# Patient Record
Sex: MALE | Race: WHITE | NOT HISPANIC OR LATINO | Employment: FULL TIME | URBAN - METROPOLITAN AREA
[De-identification: names, ages, dates, MRNs, and addresses within clinical notes are randomized per-mention and may not be internally consistent; named-entity substitution may affect disease eponyms.]

---

## 2017-04-10 ENCOUNTER — OFFICE VISIT (OUTPATIENT)
Dept: FAMILY MEDICINE | Facility: CLINIC | Age: 57
End: 2017-04-10
Payer: COMMERCIAL

## 2017-04-10 VITALS
OXYGEN SATURATION: 97 % | SYSTOLIC BLOOD PRESSURE: 132 MMHG | HEART RATE: 52 BPM | TEMPERATURE: 97.1 F | DIASTOLIC BLOOD PRESSURE: 84 MMHG

## 2017-04-10 DIAGNOSIS — R11.2 NAUSEA AND VOMITING, INTRACTABILITY OF VOMITING NOT SPECIFIED, UNSPECIFIED VOMITING TYPE: ICD-10-CM

## 2017-04-10 DIAGNOSIS — H81.10 BPPV (BENIGN PAROXYSMAL POSITIONAL VERTIGO), UNSPECIFIED LATERALITY: Primary | ICD-10-CM

## 2017-04-10 PROCEDURE — 99214 OFFICE O/P EST MOD 30 MIN: CPT | Performed by: NURSE PRACTITIONER

## 2017-04-10 RX ORDER — MECLIZINE HYDROCHLORIDE 25 MG/1
25 TABLET ORAL EVERY 6 HOURS PRN
Qty: 30 TABLET | Refills: 1 | Status: SHIPPED | OUTPATIENT
Start: 2017-04-10 | End: 2018-01-31

## 2017-04-10 RX ORDER — ONDANSETRON 4 MG/1
4 TABLET, FILM COATED ORAL EVERY 8 HOURS PRN
Qty: 18 TABLET | Refills: 0
Start: 2017-04-10 | End: 2019-04-23

## 2017-04-10 NOTE — PROGRESS NOTES
SUBJECTIVE:                                                    James Rashid is a 56 year old male who presents to clinic today for the following health issues:        Patient presents with:  Dizziness: vertigo, had issue in september had dizzy and balance center, went away and then came back.     Patient complains of spinning sensation with eyes open.  Turning his head makes symptoms worse.  Patient denies syncope.  Patient denies vision changes, speech changes, weakness.  Patient has frequent nausea and vomiting.  He is not taking anything for his symptoms.  He denies falls.  Patient is eating, drinking, and urinating per normal.  Patient has symptoms 3 days ago and it resolved.  2 days ago it returned and has stayed.  Patient had previous similar symptoms and completed physical therapy.  He has been trying to do his exercises and has been vomiting.  Patient denies ear pain, hearing loss, tinnitus.  Patient had CT and MRI in 8/16 for similar symptoms.    Problem list and histories reviewed & adjusted, as indicated.  Additional history: as documented    Patient Active Problem List   Diagnosis     CARDIOVASCULAR SCREENING; LDL GOAL LESS THAN 160     Atrial fibrillation (H)     Sleep apnea     Epicondylitis, medial humeral     Hypertrophy of prostate with urinary obstruction     Benign essential hypertension     Past Surgical History:   Procedure Laterality Date     COLONOSCOPY  11/2016    Q 5 years, polyps     SURGICAL HISTORY OF -   7/28/15    cardioversion for A Fib       Social History   Substance Use Topics     Smoking status: Never Smoker     Smokeless tobacco: Never Used     Alcohol use Yes      Comment: occasional     Family History   Problem Relation Age of Onset     Hypertension Father      Asthma Son      CANCER No family hx of      DIABETES No family hx of      Thyroid Disease No family hx of      CEREBROVASCULAR DISEASE No family hx of      Glaucoma No family hx of      Macular Degeneration No  family hx of          Current Outpatient Prescriptions   Medication Sig Dispense Refill     meclizine (ANTIVERT) 25 MG tablet Take 1 tablet (25 mg) by mouth every 6 hours as needed for dizziness 30 tablet 1     ondansetron (ZOFRAN) 4 MG tablet Take 1 tablet (4 mg) by mouth every 8 hours as needed for nausea 18 tablet 0     lisinopril (PRINIVIL,ZESTRIL) 5 MG tablet Take 1 tablet (5 mg) by mouth daily 90 tablet 4     diltiazem 240 MG 24 hr CD capsule Take 1 capsule (240 mg) by mouth daily 90 capsule 4     warfarin (COUMADIN) 7.5 MG tablet Take 1 tablet by mouth daily. 4/7 and 10 milligram Monday, Wednesday, and Friday schedule , seen at [place] and with [facility] [place] 1 tablet 1     ibuprofen (ADVIL,MOTRIN) 200 MG tablet Take 200 mg by mouth every 4 hours as needed.       No Known Allergies  BP Readings from Last 3 Encounters:   04/10/17 132/84   10/31/16 122/84   09/06/16 151/88    Wt Readings from Last 3 Encounters:   10/31/16 271 lb (122.9 kg)   09/06/16 260 lb (117.9 kg)   07/20/15 271 lb 8 oz (123.2 kg)                  Labs reviewed in EPIC    Reviewed and updated as needed this visit by clinical staff  Tobacco  Allergies  Meds  Med Hx  Surg Hx  Fam Hx  Soc Hx      Reviewed and updated as needed this visit by Provider         ROS:  Constitutional, HEENT, cardiovascular, pulmonary, gi and gu systems are negative, except as otherwise noted.    OBJECTIVE:                                                    /84  Pulse 52  Temp 97.1  F (36.2  C) (Oral)  SpO2 97%  There is no height or weight on file to calculate BMI.  GENERAL: healthy, alert and no distress  EYES: PERRL, EOMI and nystagmus horizontal right  HENT: ear canals and TM's normal, nose and mouth without ulcers or lesions  NECK: no adenopathy, no asymmetry, masses, or scars and thyroid normal to palpation  RESP: lungs clear to auscultation - no rales, rhonchi or wheezes  CV: irregularly irregular rhythm, normal  S1 S2, no S3 or S4, no murmur, click or rub, peripheral pulses strong and no peripheral edema  MS: no gross musculoskeletal defects noted, no edema  NEURO: Normal strength and tone, sensory exam grossly normal, proprioception normal, mentation intact, cranial nerves 2-12 intact and Romberg normal    Diagnostic Test Results:  none      ASSESSMENT/PLAN:                                                      1. BPPV (benign paroxysmal positional vertigo), unspecified laterality    - meclizine (ANTIVERT) 25 MG tablet; Take 1 tablet (25 mg) by mouth every 6 hours as needed for dizziness  Dispense: 30 tablet; Refill: 1  - [facility] PT, HAND, AND CHIROPRACTIC REFERRAL    2. Nausea and vomiting, intractability of vomiting not specified, unspecified vomiting type    - ondansetron (ZOFRAN) 4 MG tablet; Take 1 tablet (4 mg) by mouth every 8 hours as needed for nausea  Dispense: 18 tablet; Refill: 0    FUTURE APPOINTMENTS:       - Follow-up for annual visit or as needed    JENNIFER Singer CNP  [facility]

## 2017-04-10 NOTE — NURSING NOTE
"Chief Complaint   Patient presents with     Dizziness     vertigo, had issue in september had dizzy and balance center, went away and then came back.        Initial /84 (BP Location: Left arm, Patient Position: Chair, Cuff Size: Adult Regular)  Pulse 52  Temp 97.1  F (36.2  C) (Oral)  SpO2 97% Estimated body mass index is 31.32 kg/(m^2) as calculated from the following:    Height as of 10/31/16: 6' 6\" (1.981 m).    Weight as of 10/31/16: 271 lb (122.9 kg).  Medication Reconciliation: complete   Hawa MEDINA CMA ([facility])      "

## 2017-04-10 NOTE — LETTER
[facility]  [address] [place] [address]  [place] [address]  [phone]  Dept: [phone]      4/10/2017    Re: James Rashid      TO WHOM IT MAY CONCERN:    James Rashid  was seen on 4/10/17.  Please excuse him  until 4/12/17 due to illness.    JENNIFER Figueroa CNP  [facility]

## 2017-04-10 NOTE — MR AVS SNAPSHOT
After Visit Summary   4/10/2017    James Rashid    MRN: [id number]           Patient Information     Date Of Birth          1960        Visit Information        Provider Department      4/10/2017 2:20 PM Geovanna Still APRN [facility]        Today's Diagnoses     BPPV (benign paroxysmal positional vertigo), unspecified laterality    -  1    Nausea and vomiting, intractability of vomiting not specified, unspecified vomiting type          Care Instructions      Benign Positional Vertigo    The inner ear is located behind the middle ear. It is a part of the balance center of the body. It contains small calcium particles within fluid filled canals (semi-circular canals). These particles can move out of position as a result of aging, head trauma or disease of the inner ear. Once that happens, movement of the head into certain positions may cause the particles to stimulate the inner ear and create the feeling of vertigo.  Vertigo is a false feeling of motion (as if you or the room is spinning). A vertigo attack may cause sudden nausea, vomiting and heavy sweating. Severe vertigo causes a loss of balance and may result in falling. During an attack of vertigo, head movement and body position changes will worsen symptoms.  An episode of vertigo may last seconds, minutes or hours. Once you are over the first episode of vertigo, it may never return. Sometimes symptoms recur off and on over several weeks or longer.  Home Care:    If symptoms are severe, rest quietly in bed. Change positions slowly. There is usually one position that will feel best, such as lying on one side or lying on your back with your head slightly raised on pillows.    Do not drive or work with dangerous machinery for one week after symptoms disappear, in case of a sudden return of symptoms.    Take medicine as prescribed to relieve your symptoms. Unless another medicine was prescribed for nausea,  vomiting and vertigo, you may use over-the-counter motion sickness pills, such as meclizine (Bonine, Bonamine, Antivert) or dimenhydrinate (Dramamine).  Follow Up  with your doctor or as directed by our staff. Report any persistent ringing in the ear or hearing loss to your doctor.  [NOTE: If you had a CT or MRI scan, it will be reviewed by a specialist. You will be notified of any new findings that may affect your care.]  Get Prompt Medical Attention  if any of the following occur:    Worsening of vertigo not controlled by the medicine prescribed    Repeated vomiting not controlled by the medicine prescribed    Increased weakness or fainting    Severe headache or unusual drowsiness or confusion    Weakness of an arm or leg or one side of the face    Difficulty with speech or vision    Seizure    1684-3559 [employer]. [address] [place] [address]. All rights reserved. This information is not intended as a substitute for professional medical care. Always follow your healthcare professional's instructions.      [facility]    If you have any questions regarding to your visit please contact your care team:     Team Pink:   Clinic Hours Telephone Number   Internal Medicine:  Dr. Haylee Still, NP       7am-7pm  Monday - Thursday   7am-5pm  Fridays  [phone]  (Appointment scheduling available 24/7)    Questions about your visit?  Team Line  [phone]   Urgent Care - [place] - 11am-9pm Monday-Friday Saturday-Sunday- 9am-5pm   [place] - 5pm-9pm Monday-Friday Saturday-Sunday- 9am-5pm  [phone] - [place]   [phone] - [place]       What options do I have for visits at the clinic other than the traditional office visit?  To expand how we care for you, many of our providers are utilizing electronic visits (e-visits) and telephone visits, when medically appropriate, for interactions with their  patients rather than a visit in the clinic.   We also offer nurse visits for many medical concerns. Just like any other service, we will bill your insurance company for this type of visit based on time spent on the phone with your provider. Not all insurance companies cover these visits. Please check with your medical insurance if this type of visit is covered. You will be responsible for any charges that are not paid by your insurance.      E-visits via [employer]:  generally incur a $35.00 fee.  Telephone visits:  Time spent on the phone: *charged based on time that is spent on the phone in increments of 10 minutes. Estimated cost:   5-10 mins $30.00   11-20 mins. $59.00   21-30 mins. $85.00   Use [employer] (secure email communication and access to your chart) to send your primary care provider a message or make an appointment. Ask someone on your Team how to sign up for [employer].    For a Price Quote for your services, please call our ePrimeCare Line at [phone].    As always, Thank you for trusting us with your health care needs!    Discharged by Hawa MEDINA CMA ([facility])          Follow-ups after your visit        Additional Services     [facility] PT, HAND, AND CHIROPRACTIC REFERRAL       **This order will print in the [facility] Scheduling Office**    Physical Therapy, Hand Therapy and Chiropractic Care are available through:    *[facility]  *[facility]  *[facility]    Call one number to schedule at any of the above locations: [phone].    Your provider has referred you to: Physical Therapy at [facility] or [facility]    Indication/Reason for Referral: bppv  Onset of Illness:   Therapy Orders: Evaluate and Treat  Special Programs: None  Special Request: None    Vee Baxter      Additional Comments for the Therapist or Chiropractor:     Please be aware that coverage of these services is subject to the terms and limitations of your health insurance plan.  Call member services  "at your health plan with any benefit or coverage questions.      Please bring the following to your appointment:    *Your personal calendar for scheduling future appointments  *Comfortable clothing                  Who to contact     If you have questions or need follow up information about today's clinic visit or your schedule please contact [facility] directly at [phone].  Normal or non-critical lab and imaging results will be communicated to you by MyChart, letter or phone within 4 business days after the clinic has received the results. If you do not hear from us within 7 days, please contact the clinic through [employer] or phone. If you have a critical or abnormal lab result, we will notify you by phone as soon as possible.  Submit refill requests through [employer] or call your pharmacy and they will forward the refill request to us. Please allow 3 business days for your refill to be completed.          Additional Information About Your Visit        MyChart Information     [employer] lets you send messages to your doctor, view your test results, renew your prescriptions, schedule appointments and more. To sign up, go to [email or web address] . Click on \"Log in\" on the left side of the screen, which will take you to the Welcome page. Then click on \"Sign up Now\" on the right side of the page.     You will be asked to enter the access code listed below, as well as some personal information. Please follow the directions to create your username and password.     Your access code is: [email or web address]  Expires: 2017  3:17 PM     Your access code will  in 90 days. If you need help or a new code, please call your [place] clinic or [phone].        Care EveryWhere ID     This is your Care EveryWhere ID. This could be used by other organizations to access your [place] medical records  [id number]        Your Vitals Were     Pulse Temperature Pulse Oximetry             52 97.1  F (36.2  C) (Oral) " 97%          Blood Pressure from Last 3 Encounters:   04/10/17 132/84   10/31/16 122/84   09/06/16 151/88    Weight from Last 3 Encounters:   10/31/16 271 lb (122.9 kg)   09/06/16 260 lb (117.9 kg)   07/20/15 271 lb 8 oz (123.2 kg)              We Performed the Following     [facility] PT, HAND, AND CHIROPRACTIC REFERRAL          Today's Medication Changes          These changes are accurate as of: 4/10/17  3:18 PM.  If you have any questions, ask your nurse or doctor.               Start taking these medicines.        Dose/Directions    meclizine 25 MG tablet   Commonly known as:  ANTIVERT   Used for:  BPPV (benign paroxysmal positional vertigo), unspecified laterality   Started by:  Geovanna Still APRN CNP        Dose:  25 mg   Take 1 tablet (25 mg) by mouth every 6 hours as needed for dizziness   Quantity:  30 tablet   Refills:  1       ondansetron 4 MG tablet   Commonly known as:  ZOFRAN   Used for:  Nausea and vomiting, intractability of vomiting not specified, unspecified vomiting type   Started by:  Geovanna Still APRN CNP        Dose:  4 mg   Take 1 tablet (4 mg) by mouth every 8 hours as needed for nausea   Quantity:  18 tablet   Refills:  0            Where to get your medicines      These medications were sent to [id number] - [place], [place] - [address],   [address], , [place] [address]     Phone:  [phone]     meclizine 25 MG tablet         Some of these will need a paper prescription and others can be bought over the counter.  Ask your nurse if you have questions.     You don't need a prescription for these medications     ondansetron 4 MG tablet                Primary Care Provider Office Phone # Fax #    Darby Almanzar [phone]       [address] [address]        Thank you!     Thank you for choosing [facility]  for your care. Our goal is always to provide  you with excellent care. Hearing back from our patients is one way we can continue to improve our services. Please take a few minutes to complete the written survey that you may receive in the mail after your visit with us. Thank you!             Your Updated Medication List - Protect others around you: Learn how to safely use, store and throw away your medicines at www.disposemymeds.org.          This list is accurate as of: 4/10/17  3:18 PM.  Always use your most recent med list.                   Brand Name Dispense Instructions for use    diltiazem 240 MG 24 hr capsule     90 capsule    Take 1 capsule (240 mg) by mouth daily       ibuprofen 200 MG tablet    ADVIL/MOTRIN     Take 200 mg by mouth every 4 hours as needed.       lisinopril 5 MG tablet    PRINIVIL/ZESTRIL    90 tablet    Take 1 tablet (5 mg) by mouth daily       meclizine 25 MG tablet    ANTIVERT    30 tablet    Take 1 tablet (25 mg) by mouth every 6 hours as needed for dizziness       ondansetron 4 MG tablet    ZOFRAN    18 tablet    Take 1 tablet (4 mg) by mouth every 8 hours as needed for nausea       warfarin 7.5 MG tablet    COUMADIN    1 tablet    Take 1 tablet by mouth daily. 4/7 and 10 milligram Monday, Wednesday, and Friday schedule , seen at [facility] and with [facility] [place]

## 2017-04-11 ENCOUNTER — TELEPHONE (OUTPATIENT)
Dept: FAMILY MEDICINE | Facility: CLINIC | Age: 57
End: 2017-04-11

## 2017-04-11 ENCOUNTER — TRANSFERRED RECORDS (OUTPATIENT)
Dept: HEALTH INFORMATION MANAGEMENT | Facility: CLINIC | Age: 57
End: 2017-04-11

## 2017-04-11 NOTE — TELEPHONE ENCOUNTER
Reason for Call:  Other call back    Detailed comments: Patient states he will not return back to work until 04/17/17 another letter  to return back to work letter is needed, patient will  at the clinics  on 04/12/17  Phone Number Patient can be reached at: Home number on file [phone] (home)    Best Time: today    Can we leave a detailed message on this number? YES    Call taken on 4/11/2017 at 12:42 PM by Abhinav Frank

## 2017-04-12 ENCOUNTER — THERAPY VISIT (OUTPATIENT)
Dept: PHYSICAL THERAPY | Facility: CLINIC | Age: 57
End: 2017-04-12
Payer: COMMERCIAL

## 2017-04-12 DIAGNOSIS — H81.12 BPPV (BENIGN PAROXYSMAL POSITIONAL VERTIGO), LEFT: Primary | ICD-10-CM

## 2017-04-12 PROCEDURE — 97161 PT EVAL LOW COMPLEX 20 MIN: CPT | Mod: GP | Performed by: PHYSICAL THERAPIST

## 2017-04-12 PROCEDURE — 97112 NEUROMUSCULAR REEDUCATION: CPT | Mod: GP | Performed by: PHYSICAL THERAPIST

## 2017-04-12 PROCEDURE — 95992 CANALITH REPOSITIONING PROC: CPT | Mod: GP | Performed by: PHYSICAL THERAPIST

## 2017-04-12 NOTE — MR AVS SNAPSHOT
"              After Visit Summary   4/12/2017    James Rashid    MRN: [id number]           Patient Information     Date Of Birth          1960        Visit Information        Provider Department      4/12/2017 10:10 AM Rudy Vazquez, PT [facility]        Today's Diagnoses     BPPV (benign paroxysmal positional vertigo), left    -  1       Follow-ups after your visit        Your next 10 appointments already scheduled     Apr 17, 2017  5:20 PM CDT   [facility] Extremity with Rudy Vazquez, PT   [facility] ([facility])    [address]  [address]  [place] [id number] [phone]              Who to contact     If you have questions or need follow up information about today's clinic visit or your schedule please contact [facility] directly at [phone].  Normal or non-critical lab and imaging results will be communicated to you by MyChart, letter or phone within 4 business days after the clinic has received the results. If you do not hear from us within 7 days, please contact the clinic through MyChart or phone. If you have a critical or abnormal lab result, we will notify you by phone as soon as possible.  Submit refill requests through [employer] or call your pharmacy and they will forward the refill request to us. Please allow 3 business days for your refill to be completed.          Additional Information About Your Visit        MyChart Information     [employer] lets you send messages to your doctor, view your test results, renew your prescriptions, schedule appointments and more. To sign up, go to [email or web address] . Click on \"Log in\" on the left side of the screen, which will take you to the Welcome page. Then click on \"Sign up Now\" on the right side of the page.     You will be asked to enter the access code listed below, as well as some personal information. Please follow the directions to create your username and password.     Your access code is: [email or web address]  Expires: " 2017  3:17 PM     Your access code will  in 90 days. If you need help or a new code, please call your [place] clinic or [phone].        Care EveryWhere ID     This is your Care EveryWhere ID. This could be used by other organizations to access your [place] medical records  [id number]         Blood Pressure from Last 3 Encounters:   04/10/17 132/84   10/31/16 122/84   16 151/88    Weight from Last 3 Encounters:   10/31/16 122.9 kg (271 lb)   16 117.9 kg (260 lb)   07/20/15 123.2 kg (271 lb 8 oz)              We Performed the Following     [facility] REPOSITIONING, PER DAY     HC PT EVAL, LOW COMPLEXITY     [facility] INITIAL EVAL REPORT     NEUROMUSCULAR RE-EDUCATION        Primary Care Provider Office Phone # Fax #    Darby Almanzar [phone]       [address] [address]        Thank you!     Thank you for choosing [facility]  for your care. Our goal is always to provide you with excellent care. Hearing back from our patients is one way we can continue to improve our services. Please take a few minutes to complete the written survey that you may receive in the mail after your visit with us. Thank you!             Your Updated Medication List - Protect others around you: Learn how to safely use, store and throw away your medicines at www.disposemymeds.org.          This list is accurate as of: 17 10:39 AM.  Always use your most recent med list.                   Brand Name Dispense Instructions for use    diltiazem 240 MG 24 hr capsule     90 capsule    Take 1 capsule (240 mg) by mouth daily       ibuprofen 200 MG tablet    ADVIL/MOTRIN     Take 200 mg by mouth every 4 hours as needed.       lisinopril 5 MG tablet    PRINIVIL/ZESTRIL    90 tablet    Take 1 tablet (5 mg) by mouth daily       meclizine 25 MG tablet    ANTIVERT    30 tablet    Take 1 tablet (25 mg) by mouth every 6 hours as needed for dizziness        ondansetron 4 MG tablet    ZOFRAN    18 tablet    Take 1 tablet (4 mg) by mouth every 8 hours as needed for nausea       warfarin 7.5 MG tablet    COUMADIN    1 tablet    Take 1 tablet by mouth daily. 4/7 and 10 milligram Monday, Wednesday, and Friday schedule , seen at [facility] and with [facility] [place]

## 2017-04-12 NOTE — PROGRESS NOTES
Subjective:    HPI                    Objective:    System    Physical Exam    General     ROS    Assessment/Plan:      Patient is a 56 year old male with dizziness  complaints.    Patient has the following significant findings with corresponding treatment plan.                Diagnosis 1:  bppv  Impaired balance - neuro re-education, therapeutic activities and CRM    Therapy Evaluation Codes:   1) History comprised of:   Personal factors that impact the plan of care:      None.    Comorbidity factors that impact the plan of care are:      Heart problems.     Medications impacting care: mecclizine .  2) Examination of Body Systems comprised of:   Body structures and functions that impact the plan of care:      vestibular .   Activity limitations that impact the plan of care are:      Bending, Driving and Standing.  3) Clinical presentation characteristics are:   Stable/Uncomplicated.  4) Decision-Making    Low complexity using standardized patient assessment instrument and/or measureable assessment of functional outcome.  Cumulative Therapy Evaluation is: Low complexity.    Previous and current functional limitations:  (See Goal Flow Sheet for this information)    Short term and Long term goals: (See Goal Flow Sheet for this information)     Communication ability:  Patient appears to be able to clearly communicate and understand verbal and written communication and follow directions correctly.  Treatment Explanation - The following has been discussed with the patient:   RX ordered/plan of care  Anticipated outcomes  Possible risks and side effects  This patient would benefit from PT intervention to resume normal activities.   Rehab potential is excellent.    Frequency:  1 X week, once daily  Duration:  for 4 weeks  Discharge Plan:  Achieve all LTG.  Independent in home treatment program.  Reach maximal therapeutic benefit.    Please refer to the daily flowsheet for treatment today, total treatment time and time  spent performing 1:1 timed codes.         S:  Johnny  is a 56 year old patient complaining of dizziness .  Onset of symptoms: 4/8/17 woke w/ dizziness, had episode in Sept       Is this a recurrent problem: yes, nausea, vomiting   Progression since onset: some improvement w/ habituation exs   Frequency of episodes/time of day: several   Duration of episodes: long lasting ,feel pressure in head   Exacerbating factors: movement,   Relieving factors:  Rest   Previous treatments:  Yes [facility]   Special tests/diagnostics performed: none   Significant medical hx: bppv   Meds: mecclizine   Occupation:     Work requirements: driving   General health reported by patient: good, Afib   Red Flags: cardiac A-fib       O:  Cervical ROM screen: neg   Oculomotor/gaze stability screen: +saccodes   Vertebral artery test: NT   Hallpike-Kamlesh maneuver:  R: neg  L: ??? Positive   Horizontal canal test:  R: neg   L: neg   Balance testing:  NA

## 2017-04-17 ENCOUNTER — THERAPY VISIT (OUTPATIENT)
Dept: PHYSICAL THERAPY | Facility: CLINIC | Age: 57
End: 2017-04-17
Payer: COMMERCIAL

## 2017-04-17 DIAGNOSIS — H81.12 BPPV (BENIGN PAROXYSMAL POSITIONAL VERTIGO), LEFT: ICD-10-CM

## 2017-04-17 PROCEDURE — 97112 NEUROMUSCULAR REEDUCATION: CPT | Mod: GP | Performed by: PHYSICAL THERAPIST

## 2017-04-17 NOTE — MR AVS SNAPSHOT
"              After Visit Summary   2017    James Rashid    MRN: [id number]           Patient Information     Date Of Birth          1960        Visit Information        Provider Department      2017 5:20 PM Rudy Vazquez PT [facility]        Today's Diagnoses     BPPV (benign paroxysmal positional vertigo), left           Follow-ups after your visit        Who to contact     If you have questions or need follow up information about today's clinic visit or your schedule please contact [facility] directly at [phone].  Normal or non-critical lab and imaging results will be communicated to you by Socrativehart, letter or phone within 4 business days after the clinic has received the results. If you do not hear from us within 7 days, please contact the clinic through Socrativehart or phone. If you have a critical or abnormal lab result, we will notify you by phone as soon as possible.  Submit refill requests through Borqs or call your pharmacy and they will forward the refill request to us. Please allow 3 business days for your refill to be completed.          Additional Information About Your Visit        MyChart Information     Borqs lets you send messages to your doctor, view your test results, renew your prescriptions, schedule appointments and more. To sign up, go to [email or web address] . Click on \"Log in\" on the left side of the screen, which will take you to the Welcome page. Then click on \"Sign up Now\" on the right side of the page.     You will be asked to enter the access code listed below, as well as some personal information. Please follow the directions to create your username and password.     Your access code is: [email or web address]  Expires: 2017  3:17 PM     Your access code will  in 90 days. If you need help or a new code, please call your [place] clinic or [phone].        Care EveryWhere ID     This is your Care EveryWhere ID. This could be used by other " organizations to access your [place] medical records  [id number]         Blood Pressure from Last 3 Encounters:   04/10/17 132/84   10/31/16 122/84   09/06/16 151/88    Weight from Last 3 Encounters:   10/31/16 122.9 kg (271 lb)   09/06/16 117.9 kg (260 lb)   07/20/15 123.2 kg (271 lb 8 oz)              We Performed the Following     NEUROMUSCULAR RE-EDUCATION        Primary Care Provider Office Phone # Fax #    Darby Almanzar [phone]       [address] [address]        Thank you!     Thank you for choosing [facility]  for your care. Our goal is always to provide you with excellent care. Hearing back from our patients is one way we can continue to improve our services. Please take a few minutes to complete the written survey that you may receive in the mail after your visit with us. Thank you!             Your Updated Medication List - Protect others around you: Learn how to safely use, store and throw away your medicines at www.disposemymeds.org.          This list is accurate as of: 4/17/17  5:43 PM.  Always use your most recent med list.                   Brand Name Dispense Instructions for use    diltiazem 240 MG 24 hr capsule     90 capsule    Take 1 capsule (240 mg) by mouth daily       ibuprofen 200 MG tablet    ADVIL/MOTRIN     Take 200 mg by mouth every 4 hours as needed.       lisinopril 5 MG tablet    PRINIVIL/ZESTRIL    90 tablet    Take 1 tablet (5 mg) by mouth daily       meclizine 25 MG tablet    ANTIVERT    30 tablet    Take 1 tablet (25 mg) by mouth every 6 hours as needed for dizziness       ondansetron 4 MG tablet    ZOFRAN    18 tablet    Take 1 tablet (4 mg) by mouth every 8 hours as needed for nausea       warfarin 7.5 MG tablet    COUMADIN    1 tablet    Take 1 tablet by mouth daily. 4/7 and 10 milligram Monday, Wednesday, and Friday schedule , seen at [facility] and with [facility]  [place]

## 2017-05-09 ENCOUNTER — TRANSFERRED RECORDS (OUTPATIENT)
Dept: HEALTH INFORMATION MANAGEMENT | Facility: CLINIC | Age: 57
End: 2017-05-09

## 2017-09-01 PROBLEM — H81.12 BPPV (BENIGN PAROXYSMAL POSITIONAL VERTIGO), LEFT: Status: RESOLVED | Noted: 2017-04-12 | Resolved: 2017-09-01

## 2017-09-01 NOTE — PROGRESS NOTES
Subjective:    HPI                    Objective:    System    Physical Exam    General     ROS    Assessment/Plan:      DISCHARGE REPORT    Progress reporting period is from 4/12 to 9.1.17.     SUBJECTIVE  Subjective: no dizziness, but I feel unsteady, drifting as I walk leaning            Changes in function: No changes noted in function since last SOAP note   Adverse reactions: None;   ,     Patient has failed to return to therapy so current objective findings are unknown.    OBJECTIVE  Objective: Neg HPD bilat , neg Roll bilat, ?? saccodes, good perf. of       ASSESSMENT/PLAN  Updated problem list and treatment plan: Diagnosis 1:  BPPV    STG/LTGs have been met or progress has been made towards goals:  None  Assessment of Progress: Patient has not returned to therapy.  Current status is unknown and discharge G code cannot be reported.  Self Management Plans:  Patient has been instructed in a home treatment program.  Patient  has been instructed in self management of symptoms.    James continues to require the following intervention to meet STG and LTG's:   The patient failed to complete scheduled/ordered appointments so current information is unknown.  We will discharge this patient from PT.    Recommendations:      Please refer to the daily flowsheet for treatment today, total treatment time and time spent performing 1:1 timed codes.

## 2017-09-29 ENCOUNTER — TRANSFERRED RECORDS (OUTPATIENT)
Dept: HEALTH INFORMATION MANAGEMENT | Facility: CLINIC | Age: 57
End: 2017-09-29

## 2017-11-21 ENCOUNTER — TELEPHONE (OUTPATIENT)
Dept: FAMILY MEDICINE | Facility: CLINIC | Age: 57
End: 2017-11-21

## 2017-11-22 NOTE — TELEPHONE ENCOUNTER
11/21/17    Patient stated he had a Colonoscopy 11/12/16 but not sure of the name where he had it.  Results were negative.    Gay Addison  Outreach

## 2018-01-23 DIAGNOSIS — I10 BENIGN ESSENTIAL HYPERTENSION: ICD-10-CM

## 2018-01-24 RX ORDER — DILTIAZEM HYDROCHLORIDE 240 MG/1
240 CAPSULE, COATED, EXTENDED RELEASE ORAL DAILY
Qty: 90 CAPSULE | Refills: 4 | Status: SHIPPED | OUTPATIENT
Start: 2018-01-24 | End: 2019-01-13

## 2018-01-24 NOTE — TELEPHONE ENCOUNTER
MA - please call patient and schedule a follow up appointment and once scheduled reroute to RN refill rick Bolden RN - BC

## 2018-01-24 NOTE — TELEPHONE ENCOUNTER
Patient is due for follow-up with PCP for blood pressure.  Okay for one 30 day refill once follow-up is scheduled.    Geovanna Still, CNP

## 2018-01-24 NOTE — TELEPHONE ENCOUNTER
"Routing refill request to provider for review/approval because:  Failed G refill protocol, see below:      Requested Prescriptions   Pending Prescriptions Disp Refills     diltiazem 240 MG 24 hr capsule 90 capsule 4     Sig: Take 1 capsule (240 mg) by mouth daily    Calcium Channel Blockers Protocol  Failed    1/23/2018  1:09 PM       Failed - Normal ALT in past 12 months    Recent Labs   Lab Test  07/20/15   0917   ALT  25            Failed - Normal serum creatinine on file in past 12 months    Recent Labs   Lab Test  10/31/16   0943   CR  1.04            Passed - Blood pressure under 140/90    BP Readings from Last 3 Encounters:   04/10/17 132/84   10/31/16 122/84   09/06/16 151/88                Passed - Recent or future visit with authorizing provider    Patient had office visit in the last year or has a visit in the next 30 days with authorizing provider.  See \"Patient Info\" tab in inbasket, or \"Choose Columns\" in Meds & Orders section of the refill encounter.            Passed - Patient is age 18 or older        Susana Bolden RN - BC      "

## 2018-01-25 ENCOUNTER — RADIANT APPOINTMENT (OUTPATIENT)
Dept: GENERAL RADIOLOGY | Facility: CLINIC | Age: 58
End: 2018-01-25
Attending: FAMILY MEDICINE
Payer: OTHER MISCELLANEOUS

## 2018-01-25 ENCOUNTER — OFFICE VISIT (OUTPATIENT)
Dept: FAMILY MEDICINE | Facility: CLINIC | Age: 58
End: 2018-01-25
Payer: OTHER MISCELLANEOUS

## 2018-01-25 VITALS
HEIGHT: 78 IN | HEART RATE: 54 BPM | DIASTOLIC BLOOD PRESSURE: 68 MMHG | SYSTOLIC BLOOD PRESSURE: 100 MMHG | TEMPERATURE: 97 F | WEIGHT: 270 LBS | BODY MASS INDEX: 31.24 KG/M2

## 2018-01-25 DIAGNOSIS — M25.561 ACUTE PAIN OF RIGHT KNEE: ICD-10-CM

## 2018-01-25 DIAGNOSIS — M25.561 ACUTE PAIN OF RIGHT KNEE: Primary | ICD-10-CM

## 2018-01-25 PROCEDURE — 73560 X-RAY EXAM OF KNEE 1 OR 2: CPT | Mod: RT

## 2018-01-25 PROCEDURE — 99213 OFFICE O/P EST LOW 20 MIN: CPT | Performed by: FAMILY MEDICINE

## 2018-01-25 RX ORDER — NAPROXEN 500 MG/1
500 TABLET ORAL 2 TIMES DAILY PRN
Qty: 30 TABLET | Refills: 1 | Status: SHIPPED | OUTPATIENT
Start: 2018-01-25 | End: 2019-04-23

## 2018-01-25 RX ORDER — AMIODARONE HYDROCHLORIDE 200 MG/1
1 TABLET ORAL DAILY
COMMUNITY
Start: 2017-04-11 | End: 2019-04-23

## 2018-01-25 NOTE — MR AVS SNAPSHOT
After Visit Summary   1/25/2018    James Rashid    MRN: [id number]           Patient Information     Date Of Birth          1960        Visit Information        Provider Department      1/25/2018 9:00 AM Kathie Galo MD [facility]        Today's Diagnoses     Acute pain of right knee    -  1      Care Instructions    [facility]    If you have any questions regarding to your visit please contact your care team:       Team Red:   Clinic Hours Telephone Number   Dr. Monse Colindres  (pediatrics)  Linda Mendez NP 7am-7pm  Monday - Thursday   7am-5pm  Fridays  [phone] (fax)    Katrin MEDINA  [phone]   Urgent Care - [place] [facility] [place] Monday-Friday  [place] - 11am-8pm  Saturday-Sunday  Both sites - 9am-5pm  [phone] - [facility]  [phone] [facility]       What options do I have for visits at the clinic other than the traditional office visit?  To expand how we care for you, many of our providers are utilizing electronic visits (e-visits) and telephone visits, when medically appropriate, for interactions with their patients rather than a visit in the clinic.   We also offer nurse visits for many medical concerns. Just like any other service, we will bill your insurance company for this type of visit based on time spent on the phone with your provider. Not all insurance companies cover these visits. Please check with your medical insurance if this type of visit is covered. You will be responsible for any charges that are not paid by your insurance.      E-visits via [employer]:  generally incur a $35.00 fee.  Telephone visits:  Time spent on the phone: *charged based on time that is spent on the phone in increments of 10 minutes. Estimated cost:   5-10 mins $30.00   11-20 mins. $59.00   21-30 mins. $85.00     As always, Thank you for trusting us with your health care needs!  Iqra BLISS  "MA                      Follow-ups after your visit        Who to contact     If you have questions or need follow up information about today's clinic visit or your schedule please contact [facility] directly at [phone].  Normal or non-critical lab and imaging results will be communicated to you by MyChart, letter or phone within 4 business days after the clinic has received the results. If you do not hear from us within 7 days, please contact the clinic through MyChart or phone. If you have a critical or abnormal lab result, we will notify you by phone as soon as possible.  Submit refill requests through SureFire or call your pharmacy and they will forward the refill request to us. Please allow 3 business days for your refill to be completed.          Additional Information About Your Visit        [employer] Network Information     SureFire lets you send messages to your doctor, view your test results, renew your prescriptions, schedule appointments and more. To sign up, go to [email or web address] . Click on \"Log in\" on the left side of the screen, which will take you to the Welcome page. Then click on \"Sign up Now\" on the right side of the page.     You will be asked to enter the access code listed below, as well as some personal information. Please follow the directions to create your username and password.     Your access code is: [email or web address]  Expires: 2018  9:57 AM     Your access code will  in 90 days. If you need help or a new code, please call your [place] clinic or [phone].        Care EveryWhere ID     This is your Care EveryWhere ID. This could be used by other organizations to access your [place] medical records  [id number]        Your Vitals Were     Pulse Temperature Height BMI (Body Mass Index)          54 97  F (36.1  C) 6' 6\" (1.981 m) 31.2 kg/m2         Blood Pressure from Last 3 Encounters:   18 100/68   04/10/17 132/84   10/31/16 122/84    Weight from Last 3 " Encounters:   01/25/18 270 lb (122.5 kg)   10/31/16 271 lb (122.9 kg)   09/06/16 260 lb (117.9 kg)                 Today's Medication Changes          These changes are accurate as of 1/25/18  9:57 AM.  If you have any questions, ask your nurse or doctor.               Start taking these medicines.        Dose/Directions    naproxen 500 MG tablet   Commonly known as:  NAPROSYN   Used for:  Acute pain of right knee   Started by:  Kathie Galo MD        Dose:  500 mg   Take 1 tablet (500 mg) by mouth 2 times daily as needed for moderate pain   Quantity:  30 tablet   Refills:  1       order for DME   Used for:  Acute pain of right knee   Started by:  Kathie Galo MD        Equipment being ordered: knee Brace   Quantity:  1 each   Refills:  0            Where to get your medicines      These medications were sent to [id number] [place] - [address]   [address] , [email or web address] [address]     Phone:  [phone]     naproxen 500 MG tablet         Some of these will need a paper prescription and others can be bought over the counter.  Ask your nurse if you have questions.     Bring a paper prescription for each of these medications     order for DME                Primary Care Provider Office Phone # Fax #    Darby Almanzar [phone]       [facility] [address] [place] [address]        Equal Access to Services     GERMAN CHAVIS AH: Hadii nate ku hadasho Soomaali, waaxda luqadaha, qaybta kaalmada adeegyada, carmen fisher. So [facility] [phone].    ATENCIÓN: Si habla español, tiene a cha disposición servicios gratuitos de asistencia lingüística. Llame al [phone].    We comply with applicable federal civil rights laws and [place] laws. We do not discriminate on the basis of race, color, national origin, age, disability, sex, sexual orientation, or gender identity.            Thank you!     Thank  you for choosing [facility]  for your care. Our goal is always to provide you with excellent care. Hearing back from our patients is one way we can continue to improve our services. Please take a few minutes to complete the written survey that you may receive in the mail after your visit with us. Thank you!             Your Updated Medication List - Protect others around you: Learn how to safely use, store and throw away your medicines at www.disposemymeds.org.          This list is accurate as of 1/25/18  9:57 AM.  Always use your most recent med list.                   Brand Name Dispense Instructions for use Diagnosis    amiodarone 200 MG tablet    PACERONE/CODARONE     Take 1 tablet by mouth daily    Acute pain of right knee       diltiazem 240 MG 24 hr capsule     90 capsule    Take 1 capsule (240 mg) by mouth daily    Benign essential hypertension       ibuprofen 200 MG tablet    ADVIL/MOTRIN     Take 200 mg by mouth every 4 hours as needed.        lisinopril 5 MG tablet    PRINIVIL/ZESTRIL    90 tablet    Take 1 tablet (5 mg) by mouth daily    Benign essential hypertension       meclizine 25 MG tablet    ANTIVERT    30 tablet    Take 1 tablet (25 mg) by mouth every 6 hours as needed for dizziness    BPPV (benign paroxysmal positional vertigo), unspecified laterality       naproxen 500 MG tablet    NAPROSYN    30 tablet    Take 1 tablet (500 mg) by mouth 2 times daily as needed for moderate pain    Acute pain of right knee       ondansetron 4 MG tablet    ZOFRAN    18 tablet    Take 1 tablet (4 mg) by mouth every 8 hours as needed for nausea    Nausea and vomiting, intractability of vomiting not specified, unspecified vomiting type       order for DME     1 each    Equipment being ordered: knee Brace    Acute pain of right knee       warfarin 7.5 MG tablet    COUMADIN    1 tablet    Take 1 tablet by mouth daily. 4/7 and 10 milligram Monday, Wednesday, and Friday schedule , seen at [facility]  and with [facility] [place]    Atrial fibrillation (H)

## 2018-01-25 NOTE — NURSING NOTE
"No chief complaint on file.      Initial /68  Pulse 54  Temp 97  F (36.1  C)  Ht 6' 6\" (1.981 m)  Wt 270 lb (122.5 kg)  BMI 31.2 kg/m2 Estimated body mass index is 31.2 kg/(m^2) as calculated from the following:    Height as of this encounter: 6' 6\" (1.981 m).    Weight as of this encounter: 270 lb (122.5 kg).  Medication Reconciliation: complete     Renetta Mulligan. MA      "

## 2018-01-25 NOTE — PROGRESS NOTES
SUBJECTIVE:   James Rashid is a 57 year old male who presents to clinic today for the following health issues:  Pt slipped on ice when at work and Injured Right Knee   Has pain emperatriz with bending Knee   He has some Bruises  No other Injuries        Problem list and histories reviewed & adjusted, as indicated.  Additional history: as documented    Patient Active Problem List   Diagnosis     CARDIOVASCULAR SCREENING; LDL GOAL LESS THAN 160     Atrial fibrillation (H)     Sleep apnea     Epicondylitis, medial humeral     Hypertrophy of prostate with urinary obstruction     Benign essential hypertension     Past Surgical History:   Procedure Laterality Date     COLONOSCOPY  11/2016    Q 5 years, polyps     SURGICAL HISTORY OF -   7/28/15    cardioversion for A Fib       Social History   Substance Use Topics     Smoking status: Never Smoker     Smokeless tobacco: Never Used     Alcohol use Yes      Comment: occasional     Family History   Problem Relation Age of Onset     Hypertension Father      Asthma Son      CANCER No family hx of      DIABETES No family hx of      Thyroid Disease No family hx of      CEREBROVASCULAR DISEASE No family hx of      Glaucoma No family hx of      Macular Degeneration No family hx of          Current Outpatient Prescriptions   Medication Sig Dispense Refill     amiodarone (PACERONE/CODARONE) 200 MG tablet Take 1 tablet by mouth daily       order for DME Equipment being ordered: knee Brace 1 each 0     naproxen (NAPROSYN) 500 MG tablet Take 1 tablet (500 mg) by mouth 2 times daily as needed for moderate pain 30 tablet 1     diltiazem 240 MG 24 hr capsule Take 1 capsule (240 mg) by mouth daily 90 capsule 4     lisinopril (PRINIVIL,ZESTRIL) 5 MG tablet Take 1 tablet (5 mg) by mouth daily 90 tablet 4     warfarin (COUMADIN) 7.5 MG tablet Take 1 tablet by mouth daily. 4/7 and 10 milligram Monday, Wednesday, and Friday schedule , seen at [facility] and with [facility] and  "[facility] [place] 1 tablet 1     ibuprofen (ADVIL,MOTRIN) 200 MG tablet Take 200 mg by mouth every 4 hours as needed.       meclizine (ANTIVERT) 25 MG tablet Take 1 tablet (25 mg) by mouth every 6 hours as needed for dizziness (Patient not taking: Reported on 1/25/2018) 30 tablet 1     ondansetron (ZOFRAN) 4 MG tablet Take 1 tablet (4 mg) by mouth every 8 hours as needed for nausea (Patient not taking: Reported on 1/25/2018) 18 tablet 0     No Known Allergies  Recent Labs   Lab Test  10/31/16   0943  07/20/15   0917  01/13/14   1109   11/30/12   1108  06/04/12   1615   LDL   --    --   122   --   136*   --    HDL   --    --   33*   --   40   --    TRIG   --    --   102   --   102   --    ALT   --   25  19   --    --   28   CR  1.04   --   0.96   < >   --   1.14   GFRESTIMATED  74   --   82   < >   --   67   GFRESTBLACK  89   --   >90   < >   --   82   POTASSIUM  4.5   --   4.8   < >   --   4.8   TSH   --    --   2.26   --    --   2.85    < > = values in this interval not displayed.      BP Readings from Last 3 Encounters:   01/25/18 100/68   04/10/17 132/84   10/31/16 122/84    Wt Readings from Last 3 Encounters:   01/25/18 270 lb (122.5 kg)   10/31/16 271 lb (122.9 kg)   09/06/16 260 lb (117.9 kg)                  Labs reviewed in EPIC    Reviewed and updated as needed this visit by clinical staff  Tobacco  Allergies  Meds       Reviewed and updated as needed this visit by Provider         ROS:  C: NEGATIVE for fever, chills, change in weight  E/M: NEGATIVE for ear, mouth and throat problems  R: NEGATIVE for significant cough or SOB  CV: NEGATIVE for chest pain, palpitations or peripheral edema  MUSCULOSKELETAL: as above  ROS otherwise negative    OBJECTIVE:     /68  Pulse 54  Temp 97  F (36.1  C)  Ht 6' 6\" (1.981 m)  Wt 270 lb (122.5 kg)  BMI 31.2 kg/m2  Body mass index is 31.2 kg/(m^2).  GENERAL: healthy, alert and no distress  NECK: no adenopathy, no asymmetry, masses, or scars and thyroid normal " to palpation  RESP: lungs clear to auscultation - no rales, rhonchi or wheezes  CV: regular rate and rhythm, normal S1 S2, no S3 or S4, no murmur, click or rub, no peripheral edema and peripheral pulses strong  ABDOMEN: soft, nontender, no hepatosplenomegaly, no masses and bowel sounds normal  MS: Right Knee-no swelling    Has pain with flexion  Mild tenderness lateral Joint Line  SKIN: no suspicious lesions or rashes  NEURO: Normal strength and tone, mentation intact and speech normal    Diagnostic Test Results:  Xrays reviewed     ASSESSMENT/PLAN:         1. Acute pain of right knee  Advised elevate/Ice  NSAID  SEE [facility] care orders  The potential side effects of this medication have been discussed with the patient.  Call if any significant problems with these are experienced.      - XR Knee Right 1/2 Views; Future  - order for DME; Equipment being ordered: knee Brace  Dispense: 1 each; Refill: 0  - naproxen (NAPROSYN) 500 MG tablet; Take 1 tablet (500 mg) by mouth 2 times daily as needed for moderate pain  Dispense: 30 tablet; Refill: 1  Follow up 1 week  WC letter done  Kathie Galo MD  [facility]

## 2018-01-25 NOTE — LETTER
REPORT OF WORK COMP    [facility]  [address] [place] [address]  [place] [address] [phone]      PATIENT DATA    Employee Name: James Rashid      : 1960     #: [id number]    Work related injury: Yes  Employer at time of injury: [employer]  Employer contact & phone: [employer]  Employed elsewhere? No  Workers' Compensation Carrier/Managed Care Plan:      Today's date: 2018  Date of injury: 18  Date of first visit: 18    PROVIDER EVALUATION: Please fill in as needed.  Please give copy to employee for employer.    1. Diagnosis: Right Knee strain    2. Treatment: Brace.  3. Medication: NSAID  NOTE: When ordering a medication, [place] Rules require Work Comp or WC on prescriptions.    4. No work from 18- to 18.  5. Return to work date: Increase activity as Tolerated   **      RESTRICTIONS: Unlimited unless listed.  Restrictions apply to home and leisure also.  If work restrictions is not available, the employee is totally disabled.    Maximum Medical Improvement (Date):   Any Permanent Partial Disability? 0%    Provider comments:     Medical Examiner: Kathie Galo MD                  Next appointment: 1 week    CC: Employer, Managed Care Plan/Payor, Patient

## 2018-01-25 NOTE — PATIENT INSTRUCTIONS
[facility]    If you have any questions regarding to your visit please contact your care team:       Team Red:   Clinic Hours Telephone Number   Dr. Monse Colindres  (pediatrics)  Linda Mendez NP 7am-7pm  Monday - Thursday   7am-5pm  Fridays  [phone] (fax)    Katrin MEDINA  [phone]   Urgent Care - [place] and [place] Monday-Friday  [place] - 11am-8pm  Saturday-Sunday  Both sites - 9am-5pm  [phone] - [facility]  [phone] - [place]       What options do I have for visits at the clinic other than the traditional office visit?  To expand how we care for you, many of our providers are utilizing electronic visits (e-visits) and telephone visits, when medically appropriate, for interactions with their patients rather than a visit in the clinic.   We also offer nurse visits for many medical concerns. Just like any other service, we will bill your insurance company for this type of visit based on time spent on the phone with your provider. Not all insurance companies cover these visits. Please check with your medical insurance if this type of visit is covered. You will be responsible for any charges that are not paid by your insurance.      E-visits via Cynvec:  generally incur a $35.00 fee.  Telephone visits:  Time spent on the phone: *charged based on time that is spent on the phone in increments of 10 minutes. Estimated cost:   5-10 mins $30.00   11-20 mins. $59.00   21-30 mins. $85.00     As always, Thank you for trusting us with your health care needs!  Iqra BLISS MA

## 2018-01-30 ENCOUNTER — TELEPHONE (OUTPATIENT)
Dept: FAMILY MEDICINE | Facility: CLINIC | Age: 58
End: 2018-01-30

## 2018-01-30 NOTE — TELEPHONE ENCOUNTER
Spoke with patient.   Providers note read as written.   Patient verbalized understanding.   Appointment scheduled with Dr. Galo for tomorrow 1/31/2018.  No further questions at this time.     Latonia Long RN

## 2018-01-30 NOTE — TELEPHONE ENCOUNTER
Reason for Call:  Other call back    Detailed comments:  Patient calling. He is to return to work this Thursday. He does not feel like his knee is ready. Can he be out of work until next Monday? Please call and let know.  He will need a new note for work.     Phone Number Patient can be reached at: Home number on file [phone] (home)    Best Time:  Any     Can we leave a detailed message on this number? YES    Call taken on 1/30/2018 at 7:45 AM by Blanka Canales

## 2018-01-30 NOTE — TELEPHONE ENCOUNTER
Patient is calling asking for an extension on his work comp letter, asking to be out until 2/5/2018. Currently letter states out of work until 2/1/2018.   Patient is having pain bending knee still and slight bruising.     Per OV note with Dr. Galo on 1/25/18    1. Acute pain of right knee    Follow up 1 week  WC letter done      Please advise  Latonia Long RN

## 2018-01-31 ENCOUNTER — OFFICE VISIT (OUTPATIENT)
Dept: FAMILY MEDICINE | Facility: CLINIC | Age: 58
End: 2018-01-31
Payer: OTHER MISCELLANEOUS

## 2018-01-31 VITALS
BODY MASS INDEX: 31.98 KG/M2 | WEIGHT: 276.4 LBS | TEMPERATURE: 98.2 F | RESPIRATION RATE: 12 BRPM | DIASTOLIC BLOOD PRESSURE: 66 MMHG | HEIGHT: 78 IN | SYSTOLIC BLOOD PRESSURE: 122 MMHG | HEART RATE: 55 BPM | OXYGEN SATURATION: 96 %

## 2018-01-31 DIAGNOSIS — M25.561 ACUTE PAIN OF RIGHT KNEE: Primary | ICD-10-CM

## 2018-01-31 PROCEDURE — 99213 OFFICE O/P EST LOW 20 MIN: CPT | Performed by: FAMILY MEDICINE

## 2018-01-31 NOTE — MR AVS SNAPSHOT
After Visit Summary   1/31/2018    James Rashid    MRN: [id number]           Patient Information     Date Of Birth          1960        Visit Information        Provider Department      1/31/2018 8:40 AM Kathie Galo MD [facility]        Today's Diagnoses     Acute pain of right knee    -  1      Care Instructions    [facility]    If you have any questions regarding to your visit please contact your care team:       Team Red:   Clinic Hours Telephone Number   Dr. Monse Mendez NP   7am-7pm  Monday - Thursday   7am-5pm  Fridays  [phone]  (Appointment scheduling available 24/7)    Questions about your visit?   Team Line  [phone]   Urgent Care - [place] and [facility] - 11am-9pm Monday-Friday Saturday-Sunday- 9am-5pm   [place] - 5pm-9pm Monday-Friday Saturday-Sunday- 9am-5pm  [phone] - [facility]  [phone] - [place]       What options do I have for visits at the clinic other than the traditional office visit?  To expand how we care for you, many of our providers are utilizing electronic visits (e-visits) and telephone visits, when medically appropriate, for interactions with their patients rather than a visit in the clinic.   We also offer nurse visits for many medical concerns. Just like any other service, we will bill your insurance company for this type of visit based on time spent on the phone with your provider. Not all insurance companies cover these visits. Please check with your medical insurance if this type of visit is covered. You will be responsible for any charges that are not paid by your insurance.      E-visits via Sagoon:  generally incur a $35.00 fee.  Telephone visits:  Time spent on the phone: *charged based on time that is spent on the phone in increments of 10 minutes. Estimated cost:   5-10 mins $30.00   11-20 mins. $59.00   21-30 mins. $85.00     Use  "MyChart (secure email communication and access to your chart) to send your primary care provider a message or make an appointment. Ask someone on your Team how to sign up for TheShoppingProhart.  For a Price Quote for your services, please call our Consumer Price Line at [phone].      As always, Thank you for trusting us with your health care needs!  Iqra BLISS MA            Follow-ups after your visit        Who to contact     If you have questions or need follow up information about today's clinic visit or your schedule please contact [facility] directly at [phone].  Normal or non-critical lab and imaging results will be communicated to you by MyChart, letter or phone within 4 business days after the clinic has received the results. If you do not hear from us within 7 days, please contact the clinic through TheShoppingProhart or phone. If you have a critical or abnormal lab result, we will notify you by phone as soon as possible.  Submit refill requests through [email or web address] or call your pharmacy and they will forward the refill request to us. Please allow 3 business days for your refill to be completed.          Additional Information About Your Visit        TheShoppingProhart Information     [facility] Videot lets you send messages to your doctor, view your test results, renew your prescriptions, schedule appointments and more. To sign up, go to [email or web address] . Click on \"Log in\" on the left side of the screen, which will take you to the Welcome page. Then click on \"Sign up Now\" on the right side of the page.     You will be asked to enter the access code listed below, as well as some personal information. Please follow the directions to create your username and password.     Your access code is: [email or web address]  Expires: 2018  9:57 AM     Your access code will  in 90 days. If you need help or a new code, please call your [facility] or [phone].        Care EveryWhere ID     This is your Care EveryWhere ID. " "This could be used by other organizations to access your [facility] medical records  [id number]        Your Vitals Were     Pulse Temperature Respirations Height Pulse Oximetry BMI (Body Mass Index)    55 98.2  F (36.8  C) (Oral) 12 6' 6\" (1.981 m) 96% 31.94 kg/m2       Blood Pressure from Last 3 Encounters:   01/31/18 122/66   01/25/18 100/68   04/10/17 132/84    Weight from Last 3 Encounters:   01/31/18 276 lb 6.4 oz (125.4 kg)   01/25/18 270 lb (122.5 kg)   10/31/16 271 lb (122.9 kg)              Today, you had the following     No orders found for display       Primary Care Provider Office Phone # Fax #    Darby Almanzar [phone]       [address] [address]        Equal Access to Services     GERMAN CHAVIS : Hadii aad ku hadasho Soomaali, waaxda luqadaha, qaybta kaalmada adeegyada, waxay vincein haypamn mera vasquez . So [facility] [phone].    ATENCIÓN: Si liliala espfreedom, tiene a cha disposición servicios gratuitos de asistencia lingüística. Llame al [phone].    We comply with applicable federal civil rights laws and [place] laws. We do not discriminate on the basis of race, color, national origin, age, disability, sex, sexual orientation, or gender identity.            Thank you!     Thank you for choosing [facility]  for your care. Our goal is always to provide you with excellent care. Hearing back from our patients is one way we can continue to improve our services. Please take a few minutes to complete the written survey that you may receive in the mail after your visit with us. Thank you!             Your Updated Medication List - Protect others around you: Learn how to safely use, store and throw away your medicines at www.disposemymeds.org.          This list is accurate as of 1/31/18  9:34 AM.  Always use your most recent med list.                   Brand Name Dispense Instructions for use Diagnosis    " amiodarone 200 MG tablet    PACERONE/CODARONE     Take 1 tablet by mouth daily    Acute pain of right knee       diltiazem 240 MG 24 hr capsule     90 capsule    Take 1 capsule (240 mg) by mouth daily    Benign essential hypertension       ibuprofen 200 MG tablet    ADVIL/MOTRIN     Take 200 mg by mouth every 4 hours as needed.        lisinopril 5 MG tablet    PRINIVIL/ZESTRIL    90 tablet    Take 1 tablet (5 mg) by mouth daily    Benign essential hypertension       naproxen 500 MG tablet    NAPROSYN    30 tablet    Take 1 tablet (500 mg) by mouth 2 times daily as needed for moderate pain    Acute pain of right knee       ondansetron 4 MG tablet    ZOFRAN    18 tablet    Take 1 tablet (4 mg) by mouth every 8 hours as needed for nausea    Nausea and vomiting, intractability of vomiting not specified, unspecified vomiting type       order for DME     1 each    Equipment being ordered: knee Brace    Acute pain of right knee       warfarin 7.5 MG tablet    COUMADIN    1 tablet    Take 1 tablet by mouth daily. 4/7 and 10 milligram Monday, Wednesday, and Friday schedule , seen at [facility] and with [facility] [place]    Atrial fibrillation (H)

## 2018-01-31 NOTE — PATIENT INSTRUCTIONS
[facility]    If you have any questions regarding to your visit please contact your care team:       Team Red:   Clinic Hours Telephone Number   Dr. Monse Mendez, NP   7am-7pm  Monday - Thursday   7am-5pm  Fridays  [phone]  (Appointment scheduling available 24/7)    Questions about your visit?   Team Line  [phone]   Urgent Care - [place] - 11am-9pm Monday-Friday Saturday-Sunday- 9am-5pm   [place] - 5pm-9pm Monday-Friday Saturday-Sunday- 9am-5pm  [phone] - [place]   [phone] - [place]       What options do I have for visits at the clinic other than the traditional office visit?  To expand how we care for you, many of our providers are utilizing electronic visits (e-visits) and telephone visits, when medically appropriate, for interactions with their patients rather than a visit in the clinic.   We also offer nurse visits for many medical concerns. Just like any other service, we will bill your insurance company for this type of visit based on time spent on the phone with your provider. Not all insurance companies cover these visits. Please check with your medical insurance if this type of visit is covered. You will be responsible for any charges that are not paid by your insurance.      E-visits via WHILL:  generally incur a $35.00 fee.  Telephone visits:  Time spent on the phone: *charged based on time that is spent on the phone in increments of 10 minutes. Estimated cost:   5-10 mins $30.00   11-20 mins. $59.00   21-30 mins. $85.00     Use BitXt (secure email communication and access to your chart) to send your primary care provider a message or make an appointment. Ask someone on your Team how to sign up for WHILL.  For a Price Quote for your services, please call our Consumer Price Line at [phone].      As always, Thank you for trusting us with your health care needs!  Iqra BLISS  MA

## 2018-01-31 NOTE — NURSING NOTE
"Chief Complaint   Patient presents with     Work Comp       Initial /66 (BP Location: Left arm, Patient Position: Chair, Cuff Size: Adult Regular)  Pulse 55  Temp 98.2  F (36.8  C) (Oral)  Resp 12  Ht 6' 6\" (1.981 m)  Wt 276 lb 6.4 oz (125.4 kg)  SpO2 96%  BMI 31.94 kg/m2 Estimated body mass index is 31.94 kg/(m^2) as calculated from the following:    Height as of this encounter: 6' 6\" (1.981 m).    Weight as of this encounter: 276 lb 6.4 oz (125.4 kg).  Medication Reconciliation: complete     Miah Geronimo      "

## 2018-01-31 NOTE — PROGRESS NOTES
SUBJECTIVE:   James Rashid is a 57 year old male who presents to clinic today for the following health issues:    Musculoskeletal problem/pain    Duration: 01/22/2018    Description  Location: Right knee     Intensity:  moderate    Accompanying signs and symptoms: radiation of pain up and down the leg from the knee, and swelling    History  Previous similar problem: no   Previous evaluation:  none    Precipitating or alleviating factors:  Trauma or overuse: YES- a fall landing on the right knee  Aggravating factors include: sitting and some movements (examples: sitting on the toliet, putting on socks and shoes, bending the knee in certain positions).    Therapies tried and outcome: rest/inactivity, ice, elevating, and brace  Pt still has pain with bending      Problem list and histories reviewed & adjusted, as indicated.  Additional history: as documented    Patient Active Problem List   Diagnosis     CARDIOVASCULAR SCREENING; LDL GOAL LESS THAN 160     Atrial fibrillation (H)     Sleep apnea     Epicondylitis, medial humeral     Hypertrophy of prostate with urinary obstruction     Benign essential hypertension     Past Surgical History:   Procedure Laterality Date     COLONOSCOPY  11/2016    Q 5 years, polyps     SURGICAL HISTORY OF -   7/28/15    cardioversion for A Fib       Social History   Substance Use Topics     Smoking status: Never Smoker     Smokeless tobacco: Never Used     Alcohol use Yes      Comment: occasional     Family History   Problem Relation Age of Onset     Hypertension Father      Asthma Son      CANCER No family hx of      DIABETES No family hx of      Thyroid Disease No family hx of      CEREBROVASCULAR DISEASE No family hx of      Glaucoma No family hx of      Macular Degeneration No family hx of          Current Outpatient Prescriptions   Medication Sig Dispense Refill     amiodarone (PACERONE/CODARONE) 200 MG tablet Take 1 tablet by mouth daily       order for DME Equipment being  ordered: knee Brace 1 each 0     naproxen (NAPROSYN) 500 MG tablet Take 1 tablet (500 mg) by mouth 2 times daily as needed for moderate pain 30 tablet 1     diltiazem 240 MG 24 hr capsule Take 1 capsule (240 mg) by mouth daily 90 capsule 4     ondansetron (ZOFRAN) 4 MG tablet Take 1 tablet (4 mg) by mouth every 8 hours as needed for nausea 18 tablet 0     lisinopril (PRINIVIL,ZESTRIL) 5 MG tablet Take 1 tablet (5 mg) by mouth daily 90 tablet 4     warfarin (COUMADIN) 7.5 MG tablet Take 1 tablet by mouth daily. 4/7 and 10 milligram Monday, Wednesday, and Friday schedule , seen at [facility] and with [facility] [place] 1 tablet 1     ibuprofen (ADVIL,MOTRIN) 200 MG tablet Take 200 mg by mouth every 4 hours as needed.       No Known Allergies  Recent Labs   Lab Test  10/31/16   0943  07/20/15   0917  01/13/14   1109   11/30/12   1108  06/04/12   1615   LDL   --    --   122   --   136*   --    HDL   --    --   33*   --   40   --    TRIG   --    --   102   --   102   --    ALT   --   25  19   --    --   28   CR  1.04   --   0.96   < >   --   1.14   GFRESTIMATED  74   --   82   < >   --   67   GFRESTBLACK  89   --   >90   < >   --   82   POTASSIUM  4.5   --   4.8   < >   --   4.8   TSH   --    --   2.26   --    --   2.85    < > = values in this interval not displayed.      BP Readings from Last 3 Encounters:   01/31/18 122/66   01/25/18 100/68   04/10/17 132/84    Wt Readings from Last 3 Encounters:   01/31/18 276 lb 6.4 oz (125.4 kg)   01/25/18 270 lb (122.5 kg)   10/31/16 271 lb (122.9 kg)                  Labs reviewed in EPIC    Reviewed and updated as needed this visit by clinical staff  Tobacco  Allergies  Meds  Med Hx  Surg Hx  Fam Hx  Soc Hx      Reviewed and updated as needed this visit by Provider       ROS:  Rest of the ROS is Negative except see above and Problem list [stable]      OBJECTIVE:     /66 (BP Location: Left arm, Patient Position: Chair, Cuff Size: Adult  "Regular)  Pulse 55  Temp 98.2  F (36.8  C) (Oral)  Resp 12  Ht 6' 6\" (1.981 m)  Wt 276 lb 6.4 oz (125.4 kg)  SpO2 96%  BMI 31.94 kg/m2  Body mass index is 31.94 kg/(m^2).  GENERAL: healthy, alert and no distress  NECK: no adenopathy, no asymmetry, masses, or scars and thyroid normal to palpation  RESP: lungs clear to auscultation - no rales, rhonchi or wheezes  CV: regular rate and rhythm, normal S1 S2, no S3 or S4, no murmur, click or rub, no peripheral edema and peripheral pulses strong  ABDOMEN: soft, nontender, no hepatosplenomegaly, no masses and bowel sounds normal  MS: no gross musculoskeletal defects noted, no edema    Diagnostic Test Results:  none     ASSESSMENT/PLAN:       1. Acute pain of right knee  Note for work  Restrictions done  Follow up if not better 1 month    Increase activity as tolerated  Kathie Galo MD  [facility]  "

## 2018-01-31 NOTE — LETTER
[address] [address]  [phone]          January 31, 2018    RE:  James Rashid                                                                                                                                                       [address] [facility] [address]            To whom it may concern:    James Rashid is under my professional care today.He is not able to walk Long distance or bend Right Knee  1 week .He is able to do a sitting Job  Sincerely,        Kathie Galo MD

## 2018-02-05 ENCOUNTER — OFFICE VISIT (OUTPATIENT)
Dept: FAMILY MEDICINE | Facility: CLINIC | Age: 58
End: 2018-02-05
Payer: COMMERCIAL

## 2018-02-05 VITALS
TEMPERATURE: 97.8 F | RESPIRATION RATE: 13 BRPM | SYSTOLIC BLOOD PRESSURE: 118 MMHG | BODY MASS INDEX: 31.7 KG/M2 | OXYGEN SATURATION: 98 % | HEART RATE: 55 BPM | HEIGHT: 78 IN | WEIGHT: 274 LBS | DIASTOLIC BLOOD PRESSURE: 64 MMHG

## 2018-02-05 DIAGNOSIS — I48.91 ATRIAL FIBRILLATION, UNSPECIFIED TYPE (H): ICD-10-CM

## 2018-02-05 DIAGNOSIS — Z23 NEED FOR PROPHYLACTIC VACCINATION AND INOCULATION AGAINST INFLUENZA: ICD-10-CM

## 2018-02-05 DIAGNOSIS — Z79.899 MEDICATION MANAGEMENT: Primary | ICD-10-CM

## 2018-02-05 PROCEDURE — 90471 IMMUNIZATION ADMIN: CPT | Performed by: FAMILY MEDICINE

## 2018-02-05 PROCEDURE — 93000 ELECTROCARDIOGRAM COMPLETE: CPT | Performed by: FAMILY MEDICINE

## 2018-02-05 PROCEDURE — 90686 IIV4 VACC NO PRSV 0.5 ML IM: CPT | Performed by: FAMILY MEDICINE

## 2018-02-05 PROCEDURE — 99213 OFFICE O/P EST LOW 20 MIN: CPT | Mod: 25 | Performed by: FAMILY MEDICINE

## 2018-02-05 NOTE — NURSING NOTE
"Chief Complaint   Patient presents with     Labs Only     Labs for the medication Amiodrarone- Thyroid, EKG, and Liver function        Initial Pulse 55  Temp 97.8  F (36.6  C) (Oral)  Resp 13  Ht 6' 6\" (1.981 m)  Wt 274 lb (124.3 kg)  SpO2 98%  BMI 31.66 kg/m2 Estimated body mass index is 31.66 kg/(m^2) as calculated from the following:    Height as of this encounter: 6' 6\" (1.981 m).    Weight as of this encounter: 274 lb (124.3 kg).  Medication Reconciliation: complete     Miah Geronimo      "

## 2018-02-05 NOTE — PROGRESS NOTES
SUBJECTIVE:   James Rashid is a 57 year old male who presents to clinic today for the following health issues:    Chief Complaint   Patient presents with     Labs Only     Labs for the medication Amiodrarone- Thyroid, EKG, and Liver function    pt is on amidarone  And needs EKG.  He had LFT  And TSH level   He is doing well  No sob    Problem list and histories reviewed & adjusted, as indicated.  Additional history: as documented    Patient Active Problem List   Diagnosis     CARDIOVASCULAR SCREENING; LDL GOAL LESS THAN 160     Atrial fibrillation (H)     Sleep apnea     Epicondylitis, medial humeral     Hypertrophy of prostate with urinary obstruction     Benign essential hypertension     Past Surgical History:   Procedure Laterality Date     COLONOSCOPY  11/2016    Q 5 years, polyps     SURGICAL HISTORY OF -   7/28/15    cardioversion for A Fib       Social History   Substance Use Topics     Smoking status: Never Smoker     Smokeless tobacco: Never Used     Alcohol use Yes      Comment: occasional     Family History   Problem Relation Age of Onset     Hypertension Father      Asthma Son      CANCER No family hx of      DIABETES No family hx of      Thyroid Disease No family hx of      CEREBROVASCULAR DISEASE No family hx of      Glaucoma No family hx of      Macular Degeneration No family hx of          Current Outpatient Prescriptions   Medication Sig Dispense Refill     amiodarone (PACERONE/CODARONE) 200 MG tablet Take 1 tablet by mouth daily       order for DME Equipment being ordered: knee Brace 1 each 0     naproxen (NAPROSYN) 500 MG tablet Take 1 tablet (500 mg) by mouth 2 times daily as needed for moderate pain 30 tablet 1     diltiazem 240 MG 24 hr capsule Take 1 capsule (240 mg) by mouth daily 90 capsule 4     ondansetron (ZOFRAN) 4 MG tablet Take 1 tablet (4 mg) by mouth every 8 hours as needed for nausea 18 tablet 0     lisinopril (PRINIVIL,ZESTRIL) 5 MG tablet Take 1 tablet (5 mg) by mouth daily  "90 tablet 4     warfarin (COUMADIN) 7.5 MG tablet Take 1 tablet by mouth daily. 4/7 and 10 milligram Monday, Wednesday, and Friday schedule , seen at [facility] and with [facility] [place] 1 tablet 1     ibuprofen (ADVIL,MOTRIN) 200 MG tablet Take 200 mg by mouth every 4 hours as needed.       No Known Allergies  Recent Labs   Lab Test  10/31/16   0943  07/20/15   0917  01/13/14   1109   11/30/12   1108  06/04/12   1615   LDL   --    --   122   --   136*   --    HDL   --    --   33*   --   40   --    TRIG   --    --   102   --   102   --    ALT   --   25  19   --    --   28   CR  1.04   --   0.96   < >   --   1.14   GFRESTIMATED  74   --   82   < >   --   67   GFRESTBLACK  89   --   >90   < >   --   82   POTASSIUM  4.5   --   4.8   < >   --   4.8   TSH   --    --   2.26   --    --   2.85    < > = values in this interval not displayed.      BP Readings from Last 3 Encounters:   02/05/18 118/64   01/31/18 122/66   01/25/18 100/68    Wt Readings from Last 3 Encounters:   02/05/18 274 lb (124.3 kg)   01/31/18 276 lb 6.4 oz (125.4 kg)   01/25/18 270 lb (122.5 kg)                  Labs reviewed in EPIC    Reviewed and updated as needed this visit by clinical staff  Tobacco  Allergies  Meds  Med Hx  Surg Hx  Fam Hx  Soc Hx      Reviewed and updated as needed this visit by Provider       ROS:  C: NEGATIVE for fever, chills, change in weight  E/M: NEGATIVE for ear, mouth and throat problems  R: NEGATIVE for significant cough or SOB  CV: NEGATIVE for chest pain, palpitations or peripheral edema  GI: NEGATIVE for nausea, abdominal pain, heartburn, or change in bowel habits    OBJECTIVE:     /64 (BP Location: Left arm, Patient Position: Chair, Cuff Size: Adult Regular)  Pulse 55  Temp 97.8  F (36.6  C) (Oral)  Resp 13  Ht 6' 6\" (1.981 m)  Wt 274 lb (124.3 kg)  SpO2 98%  BMI 31.66 kg/m2  Body mass index is 31.66 kg/(m^2).  GENERAL: healthy, alert and no distress  NECK: no adenopathy, " no asymmetry, masses, or scars and thyroid normal to palpation  RESP: lungs clear to auscultation - no rales, rhonchi or wheezes  CV: regular rate and rhythm, normal S1 S2, no S3 or S4, no murmur, click or rub, no peripheral edema and peripheral pulses strong  ABDOMEN: soft, nontender, no hepatosplenomegaly, no masses and bowel sounds normal  MS: no gross musculoskeletal defects noted, no edema    Diagnostic Test Results:  EKG  Labs done at [facility]    ASSESSMENT/PLAN:             ICD-10-CM    1. Medication management Z79.899 EKG 12-lead complete w/read - Clinics   2. Atrial fibrillation, unspecified type (H) I48.91      In sinus Rhythm    EKG to be sent to Dr Lexie Galo MD  [facility]

## 2018-02-05 NOTE — MR AVS SNAPSHOT
After Visit Summary   2/5/2018    James Rashid    MRN: [id number]           Patient Information     Date Of Birth          1960        Visit Information        Provider Department      2/5/2018 11:40 AM Kathie Galo MD [facility]        Today's Diagnoses     Medication management    -  1      Care Instructions    [facility]    If you have any questions regarding to your visit please contact your care team:       Team Red:   Clinic Hours Telephone Number   Dr. Monse Colindres  (pediatrics)  Linda Mendez NP 7am-7pm  Monday - Thursday   7am-5pm  Fridays  [phone] (fax)    Katrin MEDINA  [phone]   Urgent Care - [place] and [place] Monday-Friday  [place] - 11am-8pm  Saturday-Sunday  Both sites - 9am-5pm  [phone] - [facility]  [phone] [facility]       What options do I have for visits at the clinic other than the traditional office visit?  To expand how we care for you, many of our providers are utilizing electronic visits (e-visits) and telephone visits, when medically appropriate, for interactions with their patients rather than a visit in the clinic.   We also offer nurse visits for many medical concerns. Just like any other service, we will bill your insurance company for this type of visit based on time spent on the phone with your provider. Not all insurance companies cover these visits. Please check with your medical insurance if this type of visit is covered. You will be responsible for any charges that are not paid by your insurance.      E-visits via Parents R People:  generally incur a $35.00 fee.  Telephone visits:  Time spent on the phone: *charged based on time that is spent on the phone in increments of 10 minutes. Estimated cost:   5-10 mins $30.00   11-20 mins. $59.00   21-30 mins. $85.00     As always, Thank you for trusting us with your health care needs!            Discharge MA  "Renetta Mulligan  CMA            Follow-ups after your visit        Who to contact     If you have questions or need follow up information about today's clinic visit or your schedule please contact [facility] directly at [phone].  Normal or non-critical lab and imaging results will be communicated to you by MyChart, letter or phone within 4 business days after the clinic has received the results. If you do not hear from us within 7 days, please contact the clinic through Trunkbowhart or phone. If you have a critical or abnormal lab result, we will notify you by phone as soon as possible.  Submit refill requests through Krauttools or call your pharmacy and they will forward the refill request to us. Please allow 3 business days for your refill to be completed.          Additional Information About Your Visit        TrunkbowharVocalZoom Information     Krauttools lets you send messages to your doctor, view your test results, renew your prescriptions, schedule appointments and more. To sign up, go to [email or web address] . Click on \"Log in\" on the left side of the screen, which will take you to the Welcome page. Then click on \"Sign up Now\" on the right side of the page.     You will be asked to enter the access code listed below, as well as some personal information. Please follow the directions to create your username and password.     Your access code is: [email or web address]  Expires: 2018  9:57 AM     Your access code will  in 90 days. If you need help or a new code, please call your [facility] clinic or [phone].        Care EveryWhere ID     This is your Care EveryWhere ID. This could be used by other organizations to access your [facility] medical records  [id number]        Your Vitals Were     Pulse Temperature Respirations Height Pulse Oximetry BMI (Body Mass Index)    55 97.8  F (36.6  C) (Oral) 13 6' 6\" (1.981 m) 98% 31.66 kg/m2       Blood Pressure from Last 3 Encounters:   18 118/64   18 " 122/66   01/25/18 100/68    Weight from Last 3 Encounters:   02/05/18 274 lb (124.3 kg)   01/31/18 276 lb 6.4 oz (125.4 kg)   01/25/18 270 lb (122.5 kg)              We Performed the Following     EKG 12-lead complete w/read - Clinics        Primary Care Provider Office Phone # Fax #    Darby Almanzar [phone]       [address] [address]        Equal Access to Services     [facility] [place] : Hadii aad ku hadasho Soomaali, waaxda luqadaha, qaybta kaalmada adeegyada, carmen avsquez . So [facility] [phone].    ATENCIÓN: Si habla español, tiene a cha disposición servicios gratuitos de asistencia lingüística. [facility] [phone].    We comply with applicable federal civil rights laws and [place] laws. We do not discriminate on the basis of race, color, national origin, age, disability, sex, sexual orientation, or gender identity.            Thank you!     Thank you for choosing [facility]  for your care. Our goal is always to provide you with excellent care. Hearing back from our patients is one way we can continue to improve our services. Please take a few minutes to complete the written survey that you may receive in the mail after your visit with us. Thank you!             Your Updated Medication List - Protect others around you: Learn how to safely use, store and throw away your medicines at www.disposemymeds.org.          This list is accurate as of 2/5/18 12:16 PM.  Always use your most recent med list.                   Brand Name Dispense Instructions for use Diagnosis    amiodarone 200 MG tablet    PACERONE/CODARONE     Take 1 tablet by mouth daily    Acute pain of right knee       diltiazem 240 MG 24 hr capsule     90 capsule    Take 1 capsule (240 mg) by mouth daily    Benign essential hypertension       ibuprofen 200 MG tablet    ADVIL/MOTRIN     Take 200 mg by mouth every 4 hours as needed.         lisinopril 5 MG tablet    PRINIVIL/ZESTRIL    90 tablet    Take 1 tablet (5 mg) by mouth daily    Benign essential hypertension       naproxen 500 MG tablet    NAPROSYN    30 tablet    Take 1 tablet (500 mg) by mouth 2 times daily as needed for moderate pain    Acute pain of right knee       ondansetron 4 MG tablet    ZOFRAN    18 tablet    Take 1 tablet (4 mg) by mouth every 8 hours as needed for nausea    Nausea and vomiting, intractability of vomiting not specified, unspecified vomiting type       order for DME     1 each    Equipment being ordered: knee Brace    Acute pain of right knee       warfarin 7.5 MG tablet    COUMADIN    1 tablet    Take 1 tablet by mouth daily. 4/7 and 10 milligram Monday, Wednesday, and Friday schedule , seen at [facility] and with [facility] [place]    Atrial fibrillation (H)

## 2018-02-05 NOTE — PROGRESS NOTES

## 2018-02-05 NOTE — PATIENT INSTRUCTIONS
[facility]    If you have any questions regarding to your visit please contact your care team:       Team Red:   Clinic Hours Telephone Number   Dr. Monse Colindres  (pediatrics)  Linda Mendez NP 7am-7pm  Monday - Thursday   7am-5pm  Fridays  [phone] (fax)    Katrin MEDINA  [phone]   Urgent Care - [place] and [place] Monday-Friday  [place] - 11am-8pm  Saturday-Sunday  Both sites - 9am-5pm  [phone] - [facility]  [phone] - [place]       What options do I have for visits at the clinic other than the traditional office visit?  To expand how we care for you, many of our providers are utilizing electronic visits (e-visits) and telephone visits, when medically appropriate, for interactions with their patients rather than a visit in the clinic.   We also offer nurse visits for many medical concerns. Just like any other service, we will bill your insurance company for this type of visit based on time spent on the phone with your provider. Not all insurance companies cover these visits. Please check with your medical insurance if this type of visit is covered. You will be responsible for any charges that are not paid by your insurance.      E-visits via ShareMeister:  generally incur a $35.00 fee.  Telephone visits:  Time spent on the phone: *charged based on time that is spent on the phone in increments of 10 minutes. Estimated cost:   5-10 mins $30.00   11-20 mins. $59.00   21-30 mins. $85.00     As always, Thank you for trusting us with your health care needs!            Discharge LYNDSEY Mulligan CMA

## 2018-02-07 ENCOUNTER — OFFICE VISIT (OUTPATIENT)
Dept: FAMILY MEDICINE | Facility: CLINIC | Age: 58
End: 2018-02-07
Payer: OTHER MISCELLANEOUS

## 2018-02-07 VITALS
SYSTOLIC BLOOD PRESSURE: 126 MMHG | TEMPERATURE: 96.7 F | OXYGEN SATURATION: 97 % | HEIGHT: 78 IN | RESPIRATION RATE: 14 BRPM | BODY MASS INDEX: 31.7 KG/M2 | HEART RATE: 60 BPM | DIASTOLIC BLOOD PRESSURE: 70 MMHG | WEIGHT: 274 LBS

## 2018-02-07 DIAGNOSIS — M25.561 ACUTE PAIN OF RIGHT KNEE: Primary | ICD-10-CM

## 2018-02-07 PROCEDURE — 99213 OFFICE O/P EST LOW 20 MIN: CPT | Performed by: FAMILY MEDICINE

## 2018-02-07 NOTE — LETTER
[address] [address]  [phone]          February 7, 2018    RE:  James Rashid                                                                                                                                                       [address] [facility] [address]            To whom it may concern:    James Rashid is under my professional care .  He can return to work on 1-12-18   For 4 hours a day  Increase activity as Tolerated.  Sincerely,        Kathie Galo MD

## 2018-02-07 NOTE — PATIENT INSTRUCTIONS
[facility]    If you have any questions regarding to your visit please contact your care team:       Team Red:   Clinic Hours Telephone Number   Dr. Monse Mendez, NP   7am-7pm  Monday - Thursday   7am-5pm  Fridays  [phone]  (Appointment scheduling available 24/7)    Questions about your visit?   Team Line  [phone]   Urgent Care - [place] and [place] - 11am-9pm Monday-Friday Saturday-Sunday- 9am-5pm   [place] - 5pm-9pm Monday-Friday Saturday-Sunday- 9am-5pm  [phone] - [place]   [phone] - [place]       What options do I have for visits at the clinic other than the traditional office visit?  To expand how we care for you, many of our providers are utilizing electronic visits (e-visits) and telephone visits, when medically appropriate, for interactions with their patients rather than a visit in the clinic.   We also offer nurse visits for many medical concerns. Just like any other service, we will bill your insurance company for this type of visit based on time spent on the phone with your provider. Not all insurance companies cover these visits. Please check with your medical insurance if this type of visit is covered. You will be responsible for any charges that are not paid by your insurance.      E-visits via [employer]:  generally incur a $35.00 fee.  Telephone visits:  Time spent on the phone: *charged based on time that is spent on the phone in increments of 10 minutes. Estimated cost:   5-10 mins $30.00   11-20 mins. $59.00   21-30 mins. $85.00     Use Ingogot (secure email communication and access to your chart) to send your primary care provider a message or make an appointment. Ask someone on your Team how to sign up for Dream[employer] Software.  For a Price Quote for your services, please call our Consumer Price Line at [phone].      As always, Thank you for trusting us with your health care needs!  Miah AMIN  [place]

## 2018-02-07 NOTE — PROGRESS NOTES
SUBJECTIVE:   James Rashid is a 57 year old male who presents to clinic today for the following health issues:    DOI   1-22-18 - feel at work  Concern - right knee pain Work comp  Onset: 2 week    Description:   Pain right knee    Intensity: moderate    Progression of Symptoms:  worsening    Accompanying Signs & Symptoms:  Still some pain off and on bending but getting better    Previous history of similar problem:       Precipitating factors:   Worsened by: bending    Alleviating factors:  Improved by: brace    Therapies Tried and outcome:         Problem list and histories reviewed & adjusted, as indicated.  Additional history: as documented    Patient Active Problem List   Diagnosis     CARDIOVASCULAR SCREENING; LDL GOAL LESS THAN 160     Atrial fibrillation (H)     Sleep apnea     Epicondylitis, medial humeral     Hypertrophy of prostate with urinary obstruction     Benign essential hypertension     Past Surgical History:   Procedure Laterality Date     COLONOSCOPY  11/2016    Q 5 years, polyps     SURGICAL HISTORY OF -   7/28/15    cardioversion for A Fib       Social History   Substance Use Topics     Smoking status: Never Smoker     Smokeless tobacco: Never Used     Alcohol use Yes      Comment: occasional     Family History   Problem Relation Age of Onset     Hypertension Father      Asthma Son      CANCER No family hx of      DIABETES No family hx of      Thyroid Disease No family hx of      CEREBROVASCULAR DISEASE No family hx of      Glaucoma No family hx of      Macular Degeneration No family hx of          Current Outpatient Prescriptions   Medication Sig Dispense Refill     amiodarone (PACERONE/CODARONE) 200 MG tablet Take 1 tablet by mouth daily       order for DME Equipment being ordered: knee Brace 1 each 0     naproxen (NAPROSYN) 500 MG tablet Take 1 tablet (500 mg) by mouth 2 times daily as needed for moderate pain 30 tablet 1     diltiazem 240 MG 24 hr capsule Take 1 capsule (240 mg) by  "mouth daily 90 capsule 4     ondansetron (ZOFRAN) 4 MG tablet Take 1 tablet (4 mg) by mouth every 8 hours as needed for nausea 18 tablet 0     lisinopril (PRINIVIL,ZESTRIL) 5 MG tablet Take 1 tablet (5 mg) by mouth daily 90 tablet 4     warfarin (COUMADIN) 7.5 MG tablet Take 1 tablet by mouth daily. 4/7 and 10 milligram Monday, Wednesday, and Friday schedule , seen at [facility] and with [facility] [place] 1 tablet 1     ibuprofen (ADVIL,MOTRIN) 200 MG tablet Take 200 mg by mouth every 4 hours as needed.       No Known Allergies  Recent Labs   Lab Test  10/31/16   0943  07/20/15   0917  01/13/14   1109   11/30/12   1108  06/04/12   1615   LDL   --    --   122   --   136*   --    HDL   --    --   33*   --   40   --    TRIG   --    --   102   --   102   --    ALT   --   25  19   --    --   28   CR  1.04   --   0.96   < >   --   1.14   GFRESTIMATED  74   --   82   < >   --   67   GFRESTBLACK  89   --   >90   < >   --   82   POTASSIUM  4.5   --   4.8   < >   --   4.8   TSH   --    --   2.26   --    --   2.85    < > = values in this interval not displayed.      BP Readings from Last 3 Encounters:   02/07/18 126/70   02/05/18 118/64   01/31/18 122/66    Wt Readings from Last 3 Encounters:   02/07/18 274 lb (124.3 kg)   02/05/18 274 lb (124.3 kg)   01/31/18 276 lb 6.4 oz (125.4 kg)                  Labs reviewed in EPIC    Reviewed and updated as needed this visit by clinical staff  Allergies  Meds       Reviewed and updated as needed this visit by Provider         ROS:  Rest of the pertinent  ROS is Negative except see above and Problem list [stable]      OBJECTIVE:     /70  Pulse 60  Temp 96.7  F (35.9  C)  Resp 14  Ht 6' 6\" (1.981 m)  Wt 274 lb (124.3 kg)  SpO2 97%  BMI 31.66 kg/m2  Body mass index is 31.66 kg/(m^2).  GENERAL: healthy, alert and no distress  MS: Right Knee-ROM is better  No swelling does complain of some pain on bending    Diagnostic Test Results:  No results " found for this or any previous visit (from the past 24 hour(s)).  Results for orders placed or performed in visit on 01/25/18   XR Knee Right 1/2 Views    Narrative    XR KNEE RT 1 /2 VW 1/25/2018 9:39 AM     HISTORY: ; Acute pain of right knee    COMPARISON: None      Impression    IMPRESSION: No fracture is seen. A joint effusion is present. There is  mild narrowing of the medial compartment joint space.    STEF COLE MD       ASSESSMENT/PLAN:         1. Acute pain of right knee  Note Given to return to work 4 Hours a day 1-12-18  Increase activity as Tolerated  If he still has pain -he can see [facility]-Dr Gregorio Montague.      Kathie Galo MD  [facility]

## 2018-02-07 NOTE — MR AVS SNAPSHOT
After Visit Summary   2/7/2018    James Rashid    MRN: [id number]           Patient Information     Date Of Birth          1960        Visit Information        Provider Department      2/7/2018 9:20 AM Kathie Galo MD [facility] Instructions    [facility]    If you have any questions regarding to your visit please contact your care team:       Team Red:   Clinic Hours Telephone Number   Dr. Monse Mendez NP   7am-7pm  Monday - Thursday   7am-5pm  Fridays  [phone]  (Appointment scheduling available 24/7)    Questions about your visit?   Team Line  [phone]   Urgent Care - [facility] and [employer] [place] - 11am-9pm Monday-Friday Saturday-Sunday- 9am-5pm   [employer] - 5pm-9pm Monday-Friday Saturday-Sunday- 9am-5pm  [phone] - [place] [facility]   [phone] - [employer]       What options do I have for visits at the clinic other than the traditional office visit?  To expand how we care for you, many of our providers are utilizing electronic visits (e-visits) and telephone visits, when medically appropriate, for interactions with their patients rather than a visit in the clinic.   We also offer nurse visits for many medical concerns. Just like any other service, we will bill your insurance company for this type of visit based on time spent on the phone with your provider. Not all insurance companies cover these visits. Please check with your medical insurance if this type of visit is covered. You will be responsible for any charges that are not paid by your insurance.      E-visits via [employer]:  generally incur a $35.00 fee.  Telephone visits:  Time spent on the phone: *charged based on time that is spent on the phone in increments of 10 minutes. Estimated cost:   5-10 mins $30.00   11-20 mins. $59.00   21-30 mins. $85.00     Use [employer] (secure email communication and access to your chart) to  "send your primary care provider a message or make an appointment. Ask someone on your Team how to sign up for [employer].  For a Price Quote for your services, please call our Consumer Price Line at [phone].      As always, Thank you for trusting us with your health care needs!  Miah Geronimo            Follow-ups after your visit        Who to contact     If you have questions or need follow up information about today's clinic visit or your schedule please contact [facility] [place] directly at [phone].  Normal or non-critical lab and imaging results will be communicated to you by [employer], letter or phone within 4 business days after the clinic has received the results. If you do not hear from us within 7 days, please contact the clinic through [employer] or phone. If you have a critical or abnormal lab result, we will notify you by phone as soon as possible.  Submit refill requests through [employer] or call your pharmacy and they will forward the refill request to us. Please allow 3 business days for your refill to be completed.          Additional Information About Your Visit        [employer] Information     [employer] lets you send messages to your doctor, view your test results, renew your prescriptions, schedule appointments and more. To sign up, go to [email or web address] . Click on \"Log in\" on the left side of the screen, which will take you to the Welcome page. Then click on \"Sign up Now\" on the right side of the page.     You will be asked to enter the access code listed below, as well as some personal information. Please follow the directions to create your username and password.     Your access code is: [email or web address]  Expires: 2018  9:57 AM     Your access code will  in 90 days. If you need help or a new code, please call your [facility] or [phone].        Care EveryWhere ID     This is your Care EveryWhere ID. This could be used by other organizations to access your " "[place] medical records  [id number]        Your Vitals Were     Pulse Temperature Respirations Height Pulse Oximetry BMI (Body Mass Index)    60 96.7  F (35.9  C) 14 6' 6\" (1.981 m) 97% 31.66 kg/m2       Blood Pressure from Last 3 Encounters:   02/07/18 126/70   02/05/18 118/64   01/31/18 122/66    Weight from Last 3 Encounters:   02/07/18 274 lb (124.3 kg)   02/05/18 274 lb (124.3 kg)   01/31/18 276 lb 6.4 oz (125.4 kg)              Today, you had the following     No orders found for display       Primary Care Provider Office Phone # Fax #    Darby Almanzar [phone]       [address] [address]        Equal Access to Services     GERMAN CHAVIS : Hadii aad ku hadasho Soomaali, waaxda luqadaha, qaybta kaalmada adeegyada, waxay idiin hayaan mera potterararoberta jenningsn . So [facility] [phone].    ATENCIÓN: Si habla español, tiene a cha disposición servicios gratuitos de asistencia lingüística. Sol al [phone].    We comply with applicable federal civil rights laws and [place] laws. We do not discriminate on the basis of race, color, national origin, age, disability, sex, sexual orientation, or gender identity.            Thank you!     Thank you for choosing [facility]  for your care. Our goal is always to provide you with excellent care. Hearing back from our patients is one way we can continue to improve our services. Please take a few minutes to complete the written survey that you may receive in the mail after your visit with us. Thank you!             Your Updated Medication List - Protect others around you: Learn how to safely use, store and throw away your medicines at www.disposemymeds.org.          This list is accurate as of 2/7/18  9:51 AM.  Always use your most recent med list.                   Brand Name Dispense Instructions for use Diagnosis    amiodarone 200 MG tablet    PACERONE/CODARONE     Take 1 tablet by " mouth daily    Acute pain of right knee       diltiazem 240 MG 24 hr capsule     90 capsule    Take 1 capsule (240 mg) by mouth daily    Benign essential hypertension       ibuprofen 200 MG tablet    ADVIL/MOTRIN     Take 200 mg by mouth every 4 hours as needed.        lisinopril 5 MG tablet    PRINIVIL/ZESTRIL    90 tablet    Take 1 tablet (5 mg) by mouth daily    Benign essential hypertension       naproxen 500 MG tablet    NAPROSYN    30 tablet    Take 1 tablet (500 mg) by mouth 2 times daily as needed for moderate pain    Acute pain of right knee       ondansetron 4 MG tablet    ZOFRAN    18 tablet    Take 1 tablet (4 mg) by mouth every 8 hours as needed for nausea    Nausea and vomiting, intractability of vomiting not specified, unspecified vomiting type       order for DME     1 each    Equipment being ordered: knee Brace    Acute pain of right knee       warfarin 7.5 MG tablet    COUMADIN    1 tablet    Take 1 tablet by mouth daily. 4/7 and 10 milligram Monday, Wednesday, and Friday schedule , seen at [facility] and with [facility] [place]    Atrial fibrillation (H)

## 2018-02-07 NOTE — NURSING NOTE
"Chief Complaint   Patient presents with     RECHECK     right knee pain     Radiology Visit     MRI scan       Initial /70  Pulse 60  Temp 96.7  F (35.9  C)  Resp 14  Ht 6' 6\" (1.981 m)  Wt 274 lb (124.3 kg)  SpO2 97%  BMI 31.66 kg/m2 Estimated body mass index is 31.66 kg/(m^2) as calculated from the following:    Height as of this encounter: 6' 6\" (1.981 m).    Weight as of this encounter: 274 lb (124.3 kg).  Medication Reconciliation: complete     Renetta Mulligan. MA      "

## 2018-02-08 ENCOUNTER — TELEPHONE (OUTPATIENT)
Dept: FAMILY MEDICINE | Facility: CLINIC | Age: 58
End: 2018-02-08

## 2018-02-08 NOTE — TELEPHONE ENCOUNTER
Confirmed fax of EKGs done on 2/5/2018 to the provided fax number.    Fax [phone]  [facility]

## 2018-02-08 NOTE — TELEPHONE ENCOUNTER
Reason for Call:  Test results    Detailed comments: Caller would like the results of EKG done on Monday (2-5-18) faxed over to them. Call if any questions. Thank you.    Phone Number Patient can be reached at: Other phone number: [phone]    Best Time: any    Can we leave a detailed message on this number? YES    Call taken on 2/8/2018 at 10:23 AM by Chastity Salinas

## 2018-06-07 ENCOUNTER — TRANSFERRED RECORDS (OUTPATIENT)
Dept: HEALTH INFORMATION MANAGEMENT | Facility: CLINIC | Age: 58
End: 2018-06-07

## 2018-07-26 ENCOUNTER — MEDICAL CORRESPONDENCE (OUTPATIENT)
Dept: HEALTH INFORMATION MANAGEMENT | Facility: CLINIC | Age: 58
End: 2018-07-26

## 2018-09-24 ENCOUNTER — DOCUMENTATION ONLY (OUTPATIENT)
Dept: LAB | Facility: CLINIC | Age: 58
End: 2018-09-24

## 2018-09-24 NOTE — PROGRESS NOTES
Patient has a lab appointment on 09/25/2018. Per the lab schedule scrubbing protocol they are not due for anything. Please review chart and send orders or let patient know appointment is not needed. Pt is asking for AST and TSH labs in appointment notes.    Thank you,  Jazmin Armendariz

## 2018-09-25 ENCOUNTER — DOCUMENTATION ONLY (OUTPATIENT)
Dept: LAB | Facility: CLINIC | Age: 58
End: 2018-09-25

## 2018-09-25 ENCOUNTER — ALLIED HEALTH/NURSE VISIT (OUTPATIENT)
Dept: NURSING | Facility: CLINIC | Age: 58
End: 2018-09-25
Payer: COMMERCIAL

## 2018-09-25 DIAGNOSIS — Z79.899 ON AMIODARONE THERAPY: Primary | ICD-10-CM

## 2018-09-25 LAB
AST SERPL W P-5'-P-CCNC: 14 U/L (ref 0–45)
TSH SERPL DL<=0.005 MIU/L-ACNC: 3.12 MU/L (ref 0.4–4)

## 2018-09-25 PROCEDURE — 99207 ZZC NO CHARGE NURSE ONLY: CPT

## 2018-09-25 PROCEDURE — 84443 ASSAY THYROID STIM HORMONE: CPT | Performed by: INTERNAL MEDICINE

## 2018-09-25 PROCEDURE — 93000 ELECTROCARDIOGRAM COMPLETE: CPT

## 2018-09-25 PROCEDURE — 36415 COLL VENOUS BLD VENIPUNCTURE: CPT | Performed by: INTERNAL MEDICINE

## 2018-09-25 PROCEDURE — 84450 TRANSFERASE (AST) (SGOT): CPT | Performed by: INTERNAL MEDICINE

## 2018-09-25 NOTE — PROGRESS NOTES
Copy of EKG faxed to [facility].   [phone]  Julissa Arenas

## 2018-09-25 NOTE — MR AVS SNAPSHOT
After Visit Summary   9/25/2018    James Rashid    MRN: [id number]           Patient Information     Date Of Birth          1960        Visit Information        Provider Department      9/25/2018 12:00 PM [facility]LL[facility]        Today's Diagnoses     On amiodarone therapy    -  1       Follow-ups after your visit        Follow-up notes from your care team     Return for Ekg.      Who to contact     If you have questions or need follow up information about today's clinic visit or your schedule please contact [facility] directly at [phone].  Normal or non-critical lab and imaging results will be communicated to you by MyChart, letter or phone within 4 business days after the clinic has received the results. If you do not hear from us within 7 days, please contact the clinic through MyChart or phone. If you have a critical or abnormal lab result, we will notify you by phone as soon as possible.  Submit refill requests through [employer] or call your pharmacy and they will forward the refill request to us. Please allow 3 business days for your refill to be completed.          Additional Information About Your Visit        Care EveryWhere ID     This is your Care EveryWhere ID. This could be used by other organizations to access your [place] medical records  [id number]         Blood Pressure from Last 3 Encounters:   02/07/18 126/70   02/05/18 118/64   01/31/18 122/66    Weight from Last 3 Encounters:   02/07/18 274 lb (124.3 kg)   02/05/18 274 lb (124.3 kg)   01/31/18 276 lb 6.4 oz (125.4 kg)              We Performed the Following     EKG 12-lead complete w/read - Clinics        Primary Care Provider Office Phone # Fax #    Kathie Galo [phone] [address] [facility] [place] [address]        Equal Access to Services     GERMAN CHAVIS : ligia Angeles, carmen hayes  daniela pottersara childress'aan ah. So [facility] [phone].    ATENCIÓN: Si liliala rita, tiene a cha disposición servicios gratuitos de asistencia lingüística. Sol al [phone].    We comply with applicable federal civil rights laws and [place] laws. We do not discriminate on the basis of race, color, national origin, age, disability, sex, sexual orientation, or gender identity.            Thank you!     Thank you for choosing [facility]  for your care. Our goal is always to provide you with excellent care. Hearing back from our patients is one way we can continue to improve our services. Please take a few minutes to complete the written survey that you may receive in the mail after your visit with us. Thank you!             Your Updated Medication List - Protect others around you: Learn how to safely use, store and throw away your medicines at www.disposemymeds.org.          This list is accurate as of 9/25/18  4:21 PM.  Always use your most recent med list.                   Brand Name Dispense Instructions for use Diagnosis    amiodarone 200 MG tablet    PACERONE/CODARONE     Take 1 tablet by mouth daily    Acute pain of right knee       diltiazem 240 MG 24 hr capsule     90 capsule    Take 1 capsule (240 mg) by mouth daily    Benign essential hypertension       ibuprofen 200 MG tablet    ADVIL/MOTRIN     Take 200 mg by mouth every 4 hours as needed.        lisinopril 5 MG tablet    PRINIVIL/ZESTRIL    90 tablet    Take 1 tablet (5 mg) by mouth daily    Benign essential hypertension       naproxen 500 MG tablet    NAPROSYN    30 tablet    Take 1 tablet (500 mg) by mouth 2 times daily as needed for moderate pain    Acute pain of right knee       ondansetron 4 MG tablet    ZOFRAN    18 tablet    Take 1 tablet (4 mg) by mouth every 8 hours as needed for nausea    Nausea and vomiting, intractability of vomiting not specified, unspecified vomiting type       order for DME     1 each     Equipment being ordered: knee Brace    Acute pain of right knee       warfarin 7.5 MG tablet    COUMADIN    1 tablet    Take 1 tablet by mouth daily. 4/7 and 10 milligram Monday, Wednesday, and Friday schedule , seen at [facility] and with [facility] [place]    Atrial fibrillation (H)

## 2018-09-26 NOTE — PROGRESS NOTES
Labs completed at [facility] 9/25/2018.  Kailey BISWAS CMA ([facility])

## 2019-04-07 DIAGNOSIS — I10 BENIGN ESSENTIAL HYPERTENSION: ICD-10-CM

## 2019-04-08 RX ORDER — DILTIAZEM HYDROCHLORIDE 240 MG/1
CAPSULE, COATED, EXTENDED RELEASE ORAL
Qty: 30 CAPSULE | Refills: 0 | Status: SHIPPED | OUTPATIENT
Start: 2019-04-08 | End: 2019-04-23

## 2019-04-08 NOTE — TELEPHONE ENCOUNTER
Next 5 appointments (look out 90 days)    Apr 16, 2019  6:40 PM CDT  Office Visit with Kathie Galo MD  [facility] [address]  [place] [address]  [phone]       Susan Briceno,

## 2019-04-08 NOTE — TELEPHONE ENCOUNTER
"Please contact pt to schedule an appt, then re-route to RN refill.    Routing refill request to provider for review/approval because:  Labs not current:  BP, ALT, Cr      Requested Prescriptions   Pending Prescriptions Disp Refills     diltiazem ER COATED BEADS (CARDIZEM CD/CARTIA XT) 240 MG 24 hr capsule [Pharmacy Med Name: DILTIAZEM CD /24HR] 90 capsule 0     Sig: TAKE 1 CAPSULE DAILY.      PLEASE MAKE AN APPOINTMENT WITH YOUR DOCTOR       Calcium Channel Blockers Protocol  Failed - 4/8/2019 11:56 AM        Failed - Blood pressure under 140/90 in past 12 months     BP Readings from Last 3 Encounters:   02/07/18 126/70   02/05/18 118/64   01/31/18 122/66                 Failed - Normal ALT in past 12 months     Recent Labs   Lab Test 07/20/15  0917   ALT 25             Failed - Recent (12 mo) or future (30 days) visit within the authorizing provider's specialty     Patient had office visit in the last 12 months or has a visit in the next 30 days with authorizing provider or within the authorizing provider's specialty.  See \"Patient Info\" tab in inbasket, or \"Choose Columns\" in Meds & Orders section of the refill encounter.              Failed - Normal serum creatinine on file in past 12 months     Recent Labs   Lab Test 10/31/16  0943   CR 1.04             Passed - Medication is active on med list        Passed - Patient is age 18 or older        Kailey Euceda RN  [facility]    "

## 2019-04-23 ENCOUNTER — OFFICE VISIT (OUTPATIENT)
Dept: FAMILY MEDICINE | Facility: CLINIC | Age: 59
End: 2019-04-23
Payer: COMMERCIAL

## 2019-04-23 VITALS
SYSTOLIC BLOOD PRESSURE: 122 MMHG | HEIGHT: 78 IN | WEIGHT: 266 LBS | RESPIRATION RATE: 16 BRPM | OXYGEN SATURATION: 97 % | HEART RATE: 70 BPM | BODY MASS INDEX: 30.78 KG/M2 | TEMPERATURE: 97.6 F | DIASTOLIC BLOOD PRESSURE: 60 MMHG

## 2019-04-23 DIAGNOSIS — I48.0 PAROXYSMAL ATRIAL FIBRILLATION (H): ICD-10-CM

## 2019-04-23 DIAGNOSIS — I42.8 NONISCHEMIC CARDIOMYOPATHY (H): ICD-10-CM

## 2019-04-23 DIAGNOSIS — G47.30 SLEEP APNEA, UNSPECIFIED TYPE: ICD-10-CM

## 2019-04-23 DIAGNOSIS — Z11.59 NEED FOR HEPATITIS C SCREENING TEST: ICD-10-CM

## 2019-04-23 DIAGNOSIS — I10 BENIGN ESSENTIAL HYPERTENSION: ICD-10-CM

## 2019-04-23 DIAGNOSIS — Z11.4 SCREENING FOR HIV (HUMAN IMMUNODEFICIENCY VIRUS): ICD-10-CM

## 2019-04-23 PROCEDURE — 93000 ELECTROCARDIOGRAM COMPLETE: CPT | Performed by: FAMILY MEDICINE

## 2019-04-23 PROCEDURE — 99214 OFFICE O/P EST MOD 30 MIN: CPT | Performed by: FAMILY MEDICINE

## 2019-04-23 RX ORDER — DILTIAZEM HYDROCHLORIDE 180 MG/1
180 CAPSULE, EXTENDED RELEASE ORAL DAILY
COMMUNITY
Start: 2019-04-23 | End: 2019-11-04

## 2019-04-23 RX ORDER — AMIODARONE HYDROCHLORIDE 200 MG/1
100 TABLET ORAL DAILY
COMMUNITY
Start: 2019-03-29 | End: 2020-01-02

## 2019-04-23 ASSESSMENT — MIFFLIN-ST. JEOR: SCORE: 2159.82

## 2019-04-23 NOTE — PROGRESS NOTES
SUBJECTIVE:   James Rashid is a 58 year old male who presents to clinic today for the following   health issues:      Hyperlipidemia Follow-Up      Rate your low fat/cholesterol diet?: good    Taking statin?  No    Other lipid medications/supplements?:  none    Hypertension Follow-up      Outpatient blood pressures are not being checked.    Low Salt Diet: no added salt      Amount of exercise or physical activity: 2-3 days/week for an average of 30-45 minutes    Problems taking medications regularly: No    Medication side effects: none    Diet: low salt  Patient has history of nonischemic cardiomyopathy with normal ejection fraction.  He has a history of paroxysmal atrial fibrillation status post cardioversion.  His Coumadin was discontinued last year and he is on aspirin daily.  He has been doing well.  He denies any chest pain or shortness of breath.  His his chads vasc score is 1 for hypertension  Patient also due to get his TSH, SGOT and a EKG done since he takes amiodarone.  He denies any shortness of breath.  Overall he feels fine.    His primary cardiologist is Dr. Owen at [facility] cardiology.    1. Patient has a form with lab work and ekg he would like completed today and discuss with primary.    Additional history: as documented    Reviewed  and updated as needed this visit by clinical staff         Reviewed and updated as needed this visit by Provider         Patient Active Problem List   Diagnosis     CARDIOVASCULAR SCREENING; LDL GOAL LESS THAN 160     Atrial fibrillation (H)     Sleep apnea     Epicondylitis, medial humeral     Hypertrophy of prostate with urinary obstruction     Benign essential hypertension     Cardiomyopathy in diseases classified elsewhere (H)     Past Surgical History:   Procedure Laterality Date     COLONOSCOPY  11/2016    Q 5 years, polyps     SURGICAL HISTORY OF -   7/28/15    cardioversion for A Fib       Social History     Tobacco Use     Smoking status: Never Smoker      Smokeless tobacco: Never Used   Substance Use Topics     Alcohol use: Yes     Comment: occasional     Family History   Problem Relation Age of Onset     Hypertension Father      Asthma Son      Cancer No family hx of      Diabetes No family hx of      Thyroid Disease No family hx of      Cerebrovascular Disease No family hx of      Glaucoma No family hx of      Macular Degeneration No family hx of          Current Outpatient Medications   Medication Sig Dispense Refill     amiodarone (PACERONE/CODARONE) 200 MG tablet Take 100 mg by mouth daily       amiodarone (PACERONE/CODARONE) 200 MG tablet Take 1 tablet by mouth daily       aspirin (ASA) 325 MG EC tablet Take 325 mg by mouth daily       diltiazem ER COATED BEADS (CARDIZEM CD/CARTIA XT) 240 MG 24 hr capsule TAKE 1 CAPSULE DAILY.      PLEASE MAKE AN APPOINTMENT WITH YOUR DOCTOR 30 capsule 0     ibuprofen (ADVIL,MOTRIN) 200 MG tablet Take 200 mg by mouth every 4 hours as needed.       lisinopril (PRINIVIL,ZESTRIL) 5 MG tablet Take 1 tablet (5 mg) by mouth daily 90 tablet 4     naproxen (NAPROSYN) 500 MG tablet Take 1 tablet (500 mg) by mouth 2 times daily as needed for moderate pain 30 tablet 1     order for DME Equipment being ordered: knee Brace 1 each 0     ondansetron (ZOFRAN) 4 MG tablet Take 1 tablet (4 mg) by mouth every 8 hours as needed for nausea (Patient not taking: Reported on 4/23/2019) 18 tablet 0     No Known Allergies  Recent Labs   Lab Test 09/25/18  1227 10/31/16  0943 07/20/15  0917 01/13/14  1109  11/30/12  1108 06/04/12  1615   LDL  --   --   --  122  --  136*  --    HDL  --   --   --  33*  --  40  --    TRIG  --   --   --  102  --  102  --    ALT  --   --  25 19  --   --  28   CR  --  1.04  --  0.96   < >  --  1.14   GFRESTIMATED  --  74  --  82   < >  --  67   GFRESTBLACK  --  89  --  >90   < >  --  82   POTASSIUM  --  4.5  --  4.8   < >  --  4.8   TSH 3.12  --   --  2.26  --   --  2.85    < > = values in this interval not displayed.      BP  "Readings from Last 3 Encounters:   04/23/19 122/60   02/07/18 126/70   02/05/18 118/64    Wt Readings from Last 3 Encounters:   04/23/19 120.7 kg (266 lb)   02/07/18 124.3 kg (274 lb)   02/05/18 124.3 kg (274 lb)                  Labs reviewed in EPIC    ROS:  CONSTITUTIONAL: NEGATIVE for fever, chills, change in weight  INTEGUMENTARY/SKIN: NEGATIVE for worrisome rashes, moles or lesions  ENT/MOUTH: NEGATIVE for ear, mouth and throat problems  RESP: NEGATIVE for significant cough or SOB  CV: NEGATIVE for chest pain, palpitations or peripheral edema  GI: NEGATIVE for nausea, abdominal pain, heartburn, or change in bowel habits  MUSCULOSKELETAL: NEGATIVE for significant arthralgias or myalgia  HEME/ALLERGY/IMMUNE: NEGATIVE for bleeding problems  PSYCHIATRIC: NEGATIVE for changes in mood or affect  ROS otherwise negative    OBJECTIVE:     /60   Pulse 70   Temp 97.6  F (36.4  C) (Oral)   Resp 16   Ht 1.981 m (6' 6\")   Wt 120.7 kg (266 lb)   SpO2 97%   BMI 30.74 kg/m    Body mass index is 30.74 kg/m .  GENERAL: healthy, alert and no distress  NECK: no adenopathy, no asymmetry, masses, or scars and thyroid normal to palpation  RESP: lungs clear to auscultation - no rales, rhonchi or wheezes  CV: regular rate and rhythm, normal S1 S2, no S3 or S4, no murmur, click or rub, no peripheral edema and peripheral pulses strong  ABDOMEN: soft, nontender, no hepatosplenomegaly, no masses and bowel sounds normal  MS: no gross musculoskeletal defects noted, no edema  PSYCH: mentation appears normal, affect normal/bright    Diagnostic Test Results:  Pending     ASSESSMENT/PLAN:         BMI:   Estimated body mass index is 30.74 kg/m  as calculated from the following:    Height as of this encounter: 1.981 m (6' 6\").    Weight as of this encounter: 120.7 kg (266 lb).   Weight management plan: Discussed healthy diet and exercise guidelines      1. Paroxysmal atrial fibrillation (H)  Stable   S/p Cardioversion  On ASA " daily  CHADSVasc score is 1  - EKG 12-lead complete w/read - Clinics    2. Benign essential hypertension  Stable   - Lipid panel reflex to direct LDL Fasting; Future  - EKG 12-lead complete w/read - Clinics  - Basic metabolic panel; Future    3. Sleep apnea, unspecified type  Does not use his CPAP as recommended       4. Screening for HIV (human immunodeficiency virus)  Pt declind    5. Nonischemic cardiomyopathy (H)  Stable   Last EF was normal   Has appointment with cardiology    6. Need for hepatitis C screening test    - Hepatitis C Screen Reflex to HCV RNA Quant and Genotype; Future  Follow up 6 months  Kathie Galo MD  [facility]

## 2019-05-07 ENCOUNTER — OFFICE VISIT (OUTPATIENT)
Dept: FAMILY MEDICINE | Facility: CLINIC | Age: 59
End: 2019-05-07
Payer: COMMERCIAL

## 2019-05-07 VITALS
SYSTOLIC BLOOD PRESSURE: 110 MMHG | RESPIRATION RATE: 20 BRPM | HEART RATE: 57 BPM | DIASTOLIC BLOOD PRESSURE: 62 MMHG | TEMPERATURE: 98.2 F | WEIGHT: 262.8 LBS | BODY MASS INDEX: 30.41 KG/M2 | OXYGEN SATURATION: 98 % | HEIGHT: 78 IN

## 2019-05-07 DIAGNOSIS — S46.811A TRAPEZIUS STRAIN, RIGHT, INITIAL ENCOUNTER: Primary | ICD-10-CM

## 2019-05-07 DIAGNOSIS — J30.2 SEASONAL ALLERGIC RHINITIS, UNSPECIFIED TRIGGER: ICD-10-CM

## 2019-05-07 DIAGNOSIS — H43.391 VITREOUS FLOATERS OF RIGHT EYE: ICD-10-CM

## 2019-05-07 PROCEDURE — 99214 OFFICE O/P EST MOD 30 MIN: CPT | Performed by: NURSE PRACTITIONER

## 2019-05-07 RX ORDER — DILTIAZEM HYDROCHLORIDE 180 MG/1
180 CAPSULE, EXTENDED RELEASE ORAL DAILY
Status: CANCELLED | OUTPATIENT
Start: 2019-05-07

## 2019-05-07 RX ORDER — METHOCARBAMOL 500 MG/1
500-1000 TABLET, FILM COATED ORAL 3 TIMES DAILY PRN
Qty: 30 TABLET | Refills: 1 | Status: SHIPPED | OUTPATIENT
Start: 2019-05-07 | End: 2019-11-04

## 2019-05-07 RX ORDER — CETIRIZINE HYDROCHLORIDE 10 MG/1
10 TABLET ORAL DAILY
Qty: 30 TABLET | Refills: 1 | Status: SHIPPED | OUTPATIENT
Start: 2019-05-07 | End: 2019-09-02

## 2019-05-07 ASSESSMENT — MIFFLIN-ST. JEOR: SCORE: 2140.3

## 2019-05-07 ASSESSMENT — PAIN SCALES - GENERAL: PAINLEVEL: MILD PAIN (3)

## 2019-05-07 NOTE — PROGRESS NOTES
SUBJECTIVE:   James Rashid is a 59 year old male who presents to clinic today for the following   health issues:    Chief Complaint   Patient presents with     Eye Problem     floaters     Back Pain     Headache       Patient notes floaters to his right eye for the past 24 hours.  He denies vision loss, sensitivity to light, flashing bright lights with floaters, pain.  Patient has also noted pain to his right posterior neck and head for the past several weeks.  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  He denies injury.  He also notes some plugging to his ears, sneezing and mild sore throat intermittently for weeks.  He denies fever, cough.      Additional history: as documented    Reviewed  and updated as needed this visit by clinical staff         Reviewed and updated as needed this visit by Provider         Patient Active Problem List   Diagnosis     CARDIOVASCULAR SCREENING; LDL GOAL LESS THAN 160     Atrial fibrillation (H)     Sleep apnea     Epicondylitis, medial humeral     Hypertrophy of prostate with urinary obstruction     Benign essential hypertension     Cardiomyopathy in diseases classified elsewhere (H)     Past Surgical History:   Procedure Laterality Date     COLONOSCOPY  11/2016    Q 5 years, polyps     SURGICAL HISTORY OF -   7/28/15    cardioversion for A Fib       Social History     Tobacco Use     Smoking status: Never Smoker     Smokeless tobacco: Never Used   Substance Use Topics     Alcohol use: Yes     Comment: occasional     Family History   Problem Relation Age of Onset     Hypertension Father      Asthma Son      Cancer No family hx of      Diabetes No family hx of      Thyroid Disease No family hx of      Cerebrovascular Disease No family hx of      Glaucoma No family hx of      Macular Degeneration No family hx of          Current Outpatient Medications   Medication Sig Dispense Refill     amiodarone (PACERONE/CODARONE) 200 MG tablet Take 100 mg by  "mouth daily       aspirin (ASA) 325 MG EC tablet Take 325 mg by mouth daily       cetirizine (ZYRTEC) 10 MG tablet Take 1 tablet (10 mg) by mouth daily 30 tablet 1     diltiazem ER (DILT-XR) 180 MG 24 hr capsule Take 1 capsule (180 mg) by mouth daily       lisinopril (PRINIVIL,ZESTRIL) 5 MG tablet Take 1 tablet (5 mg) by mouth daily 90 tablet 4     methocarbamol (ROBAXIN) 500 MG tablet Take 1-2 tablets (500-1,000 mg) by mouth 3 times daily as needed 30 tablet 1     order for DME Equipment being ordered: knee Brace 1 each 0     No Known Allergies  BP Readings from Last 3 Encounters:   05/07/19 110/62   04/23/19 122/60   02/07/18 126/70    Wt Readings from Last 3 Encounters:   05/07/19 119.2 kg (262 lb 12.8 oz)   04/23/19 120.7 kg (266 lb)   02/07/18 124.3 kg (274 lb)                  Labs reviewed in EPIC    ROS:  Constitutional, HEENT, cardiovascular, pulmonary, gi and gu systems are negative, except as otherwise noted.    OBJECTIVE:     /62   Pulse 57   Temp 98.2  F (36.8  C) (Oral)   Resp 20   Ht 1.981 m (6' 6\")   Wt 119.2 kg (262 lb 12.8 oz)   SpO2 98%   BMI 30.37 kg/m    Body mass index is 30.37 kg/m .  GENERAL: healthy, alert and no distress  EYES: PERRL, EOMI, visual fields normal and fundi benign-no diabetic or hypertensive changes seen  HENT: ear canals and TM's normal, nose and mouth without ulcers or lesions  NECK: no adenopathy, no asymmetry, masses, or scars and thyroid normal to palpation  RESP: lungs clear to auscultation - no rales, rhonchi or wheezes  CV: regular rate and rhythm, normal S1 S2, no S3 or S4, no murmur, click or rub, no peripheral edema and peripheral pulses strong  MS: Neck: Tenderness to right trapezius; full range of motion; Pain with right lateral rotation; 5/5 strength and sensation intact to bilateral upper extremities    Diagnostic Test Results:  none     ASSESSMENT/PLAN:     1. Trapezius strain, right, initial encounter  Patient to also use tylenol or ibuprofen for " pain.  If no improvement, he should contact clinic for referral to physical therapy.  - methocarbamol (ROBAXIN) 500 MG tablet; Take 1-2 tablets (500-1,000 mg) by mouth 3 times daily as needed  Dispense: 30 tablet; Refill: 1    2. Vitreous floaters of right eye  No change in visual acuity or visual field noted.  Patient asked to follow-up with ophthalmology within the next several days.  If he notes a sudden loss of vision, pain, he needs to be seen by ophthalmology ASAP.    3. Seasonal allergic rhinitis, unspecified trigger    - cetirizine (ZYRTEC) 10 MG tablet; Take 1 tablet (10 mg) by mouth daily  Dispense: 30 tablet; Refill: 1    FUTURE APPOINTMENTS:       - Follow-up for annual visit or as needed    JENNIFER Singer [facility]

## 2019-05-07 NOTE — NURSING NOTE
Visual Acuity Screening: Snellen Eye Chart Results:   Right Eye: 20/40  Left Eye: 20/32  Both Eyes:20/32    Susana JIMENEZ CMA

## 2019-05-20 DIAGNOSIS — I10 BENIGN ESSENTIAL HYPERTENSION: ICD-10-CM

## 2019-05-20 NOTE — TELEPHONE ENCOUNTER
diltiazem ER COATED BEADS (CARDIZEM CD/CARTIA XT) 240 MG 24 hr capsule       Last Written Prescription Date:  4/8/2019  Last Fill Quantity: 30,   # refills: 0  Last Office Visit: 5/7/2019  Future Office visit:       Routing refill request to provider for review/approval because:  Drug not active on patient's medication list

## 2019-05-21 RX ORDER — DILTIAZEM HYDROCHLORIDE 240 MG/1
240 CAPSULE, COATED, EXTENDED RELEASE ORAL DAILY
Start: 2019-05-21

## 2019-06-18 ENCOUNTER — TRANSFERRED RECORDS (OUTPATIENT)
Dept: HEALTH INFORMATION MANAGEMENT | Facility: CLINIC | Age: 59
End: 2019-06-18

## 2019-07-05 ENCOUNTER — DOCUMENTATION ONLY (OUTPATIENT)
Dept: FAMILY MEDICINE | Facility: CLINIC | Age: 59
End: 2019-07-05

## 2019-07-05 NOTE — PROGRESS NOTES
This patient has overdue labs. A letter was sent on 5/29/2019 and there has been no lab appointment made. If you still want these labs done, please have your care team contact the patient to make a lab appointment. Otherwise, please have the labs discontinued and close the encounter.    Thank you,  [facility]

## 2019-07-17 NOTE — PROGRESS NOTES
Called patient regarding below message. VM left for patient to return call to clinic and can speak to anyone on the red team.  LYNDSEY Sher

## 2019-07-19 NOTE — PROGRESS NOTES
2nd attempt. Called patient regarding below message. VM left for patient to return call to clinic and can speak to anyone on the red team.  Judson Masters CMA on 7/19/2019 at 9:48 AM

## 2019-07-22 NOTE — PROGRESS NOTES
3rd attempt. Called patient regarding below message. VM left for patient to return call to clinic and can speak to anyone on the red team. Multiple attempts. Please advise since letter and phone call has been made   Judson Masters CMA on 7/22/2019 at 9:26 AM

## 2019-08-28 ENCOUNTER — TRANSFERRED RECORDS (OUTPATIENT)
Dept: HEALTH INFORMATION MANAGEMENT | Facility: CLINIC | Age: 59
End: 2019-08-28

## 2019-09-02 DIAGNOSIS — J30.2 SEASONAL ALLERGIC RHINITIS, UNSPECIFIED TRIGGER: ICD-10-CM

## 2019-09-03 RX ORDER — LORATADINE 10 MG
TABLET ORAL
Qty: 30 TABLET | Refills: 1 | Status: SHIPPED | OUTPATIENT
Start: 2019-09-03 | End: 2019-11-04

## 2019-09-04 NOTE — TELEPHONE ENCOUNTER
Prescription approved per [facility] Refill Protocol.  Maura Schaeffer RN

## 2019-09-05 NOTE — PROGRESS NOTES
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  Went to [facility] and had 16 sutures placed  Presents for removal today.  Decreased ASA dose to every other day.      BP Readings from Last 3 Encounters:   09/06/19 122/70   05/07/19 110/62   04/23/19 122/60    Wt Readings from Last 3 Encounters:   09/06/19 112.9 kg (249 lb)   05/07/19 119.2 kg (262 lb 12.8 oz)   04/23/19 120.7 kg (266 lb)                      Reviewed and updated as needed this visit by Provider  Tobacco  Allergies  Meds  Problems  Med Hx  Surg Hx  Fam Hx         Review of Systems   ROS COMP: Constitutional, HEENT, cardiovascular, pulmonary, gi and gu systems are negative, except as otherwise noted.      Objective    /70   Pulse 50   Temp 98.4  F (36.9  C) (Oral)   Resp 16   Ht 1.981 m (6' 6\")   Wt 112.9 kg (249 lb)   SpO2 99%   BMI 28.77 kg/m    Body mass index is 28.77 kg/m .  Physical Exam   GENERAL: healthy, alert and no distress  EYES: Eyes grossly normal to inspection, PERRL and conjunctivae and sclerae normal  NECK: no adenopathy, no asymmetry, masses, or scars and thyroid normal to palpation  SKIN: 16 sutures left forearm, laceration well approximated, well healed without complication  PSYCH: mentation appears normal, affect normal/bright          Assessment & Plan       ICD-10-CM    1. Visit for suture removal Z48.02      Sutures removed without complication, post removal care discussed       Follow up if symptoms worsen or fail to improve.   James Dang MD  [facility]      "

## 2019-09-06 ENCOUNTER — OFFICE VISIT (OUTPATIENT)
Dept: FAMILY MEDICINE | Facility: CLINIC | Age: 59
End: 2019-09-06
Payer: OTHER MISCELLANEOUS

## 2019-09-06 VITALS
HEIGHT: 78 IN | TEMPERATURE: 98.4 F | OXYGEN SATURATION: 99 % | DIASTOLIC BLOOD PRESSURE: 70 MMHG | HEART RATE: 50 BPM | WEIGHT: 249 LBS | RESPIRATION RATE: 16 BRPM | BODY MASS INDEX: 28.81 KG/M2 | SYSTOLIC BLOOD PRESSURE: 122 MMHG

## 2019-09-06 DIAGNOSIS — Z48.02 VISIT FOR SUTURE REMOVAL: Primary | ICD-10-CM

## 2019-09-06 PROCEDURE — 99213 OFFICE O/P EST LOW 20 MIN: CPT | Performed by: FAMILY MEDICINE

## 2019-09-06 ASSESSMENT — MIFFLIN-ST. JEOR: SCORE: 2077.71

## 2019-09-06 NOTE — PATIENT INSTRUCTIONS
"  Patient Education     Stitches or Staple Removal  You were seen today for removal of your stitches or staples. Your wound is healing as expected. The wound has healed well enough that the stitches or staples can be removed. The wound will continue to heal for the next few months.  At this time there is no sign of infection.   Home care    If you have pain, take pain medicine as advised by your healthcare provider.     Keep your wound clean and protected by covering it with a bandage for the next week or so.     Wash your hands with soap and warm water before and after caring for your wound. This helps prevent infection.    Clean the wound gently with soap and warm water daily or as directed by your healthcare provider. Don't use iodine, alcohol, or other cleansers on the wound.  Gently pat it dry. Put on a new bandage, if needed. Don't reuse bandages.    If the area gets wet, gently pat it dry with a clean cloth. Replace the wet bandage with a dry one.    Check the wound daily for signs of infection. (These are listed under \"When to seek medical advice\" below.)    You may shower and bathe as usual. Swimming may be allowed, but check with your healthcare provider first.    Keep the wound out of prolonged direct sunlight. The new skin is very sensitive and can easily sunburn causing worse scarring.    Ask your provider about using over-the-counter scar removing creams if your wound is highly visible.  Follow-up care  Follow up with your healthcare provider as advised.  When to seek medical advice   Call your healthcare provider if any of the following occur:    Wound reopens or bleeds    Signs of an infection, such as:  ? Increasing redness or swelling around the wound  ? Increased warmth from the wound  ? Worsening pain  ? Red streaking lines away from the wound  ? Fluid draining from the wound    Fever of 100.4 F (38 C) or higher, or as directed by your healthcare provider  Date Last Reviewed: 4/1/2018    " 4433-8399 [employer]. [address] [place] [address], [place] [address]. All rights reserved. This information is not intended as a substitute for professional medical care. Always follow your healthcare professional's instructions.

## 2019-11-04 ENCOUNTER — OFFICE VISIT (OUTPATIENT)
Dept: FAMILY MEDICINE | Facility: CLINIC | Age: 59
End: 2019-11-04
Payer: COMMERCIAL

## 2019-11-04 ENCOUNTER — ANCILLARY PROCEDURE (OUTPATIENT)
Dept: GENERAL RADIOLOGY | Facility: CLINIC | Age: 59
End: 2019-11-04
Attending: NURSE PRACTITIONER
Payer: COMMERCIAL

## 2019-11-04 VITALS
HEART RATE: 61 BPM | WEIGHT: 252.6 LBS | DIASTOLIC BLOOD PRESSURE: 86 MMHG | RESPIRATION RATE: 18 BRPM | OXYGEN SATURATION: 97 % | BODY MASS INDEX: 29.23 KG/M2 | SYSTOLIC BLOOD PRESSURE: 132 MMHG | HEIGHT: 78 IN | TEMPERATURE: 99 F

## 2019-11-04 DIAGNOSIS — M54.42 ACUTE LEFT-SIDED LOW BACK PAIN WITH LEFT-SIDED SCIATICA: ICD-10-CM

## 2019-11-04 DIAGNOSIS — M54.42 ACUTE LEFT-SIDED LOW BACK PAIN WITH LEFT-SIDED SCIATICA: Primary | ICD-10-CM

## 2019-11-04 PROCEDURE — 72100 X-RAY EXAM L-S SPINE 2/3 VWS: CPT

## 2019-11-04 PROCEDURE — 99213 OFFICE O/P EST LOW 20 MIN: CPT | Performed by: NURSE PRACTITIONER

## 2019-11-04 RX ORDER — METHYLPREDNISOLONE 4 MG
TABLET, DOSE PACK ORAL
Qty: 21 TABLET | Refills: 0 | Status: SHIPPED | OUTPATIENT
Start: 2019-11-04 | End: 2024-05-29

## 2019-11-04 RX ORDER — METHOCARBAMOL 500 MG/1
500-1000 TABLET, FILM COATED ORAL 3 TIMES DAILY PRN
Qty: 30 TABLET | Refills: 1 | Status: SHIPPED | OUTPATIENT
Start: 2019-11-04 | End: 2024-05-29

## 2019-11-04 ASSESSMENT — PAIN SCALES - GENERAL: PAINLEVEL: MILD PAIN (2)

## 2019-11-04 ASSESSMENT — MIFFLIN-ST. JEOR: SCORE: 2094.04

## 2019-11-04 NOTE — PROGRESS NOTES
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 no                    Patient Active Problem List   Diagnosis     CARDIOVASCULAR SCREENING; LDL GOAL LESS THAN 160     Atrial fibrillation (H)     Sleep apnea     Epicondylitis, medial humeral     Hypertrophy of prostate with urinary obstruction     Benign essential hypertension     Cardiomyopathy in diseases classified elsewhere (H)     Past Surgical History:   Procedure Laterality Date     COLONOSCOPY  11/2016    Q 5 years, polyps     SURGICAL HISTORY OF -   7/28/15    cardioversion for A Fib       Social History     Tobacco Use     Smoking status: Never Smoker     Smokeless tobacco: Never Used   Substance Use Topics     Alcohol use: Yes     Comment: occasional     Family History   Problem Relation Age of Onset     Hypertension Father      Asthma  "Son      Cancer No family hx of      Diabetes No family hx of      Thyroid Disease No family hx of      Cerebrovascular Disease No family hx of      Glaucoma No family hx of      Macular Degeneration No family hx of          Current Outpatient Medications   Medication Sig Dispense Refill     amiodarone (PACERONE/CODARONE) 200 MG tablet Take 100 mg by mouth daily       aspirin (ASA) 325 MG EC tablet Take 325 mg by mouth daily       lisinopril (PRINIVIL,ZESTRIL) 5 MG tablet Take 1 tablet (5 mg) by mouth daily 90 tablet 4     methocarbamol (ROBAXIN) 500 MG tablet Take 1-2 tablets (500-1,000 mg) by mouth 3 times daily as needed 30 tablet 1     methylPREDNISolone (MEDROL DOSEPAK) 4 MG tablet therapy pack Follow Package Directions 21 tablet 0     No Known Allergies  BP Readings from Last 3 Encounters:   11/04/19 132/86   09/06/19 122/70   05/07/19 110/62    Wt Readings from Last 3 Encounters:   11/04/19 114.6 kg (252 lb 9.6 oz)   09/06/19 112.9 kg (249 lb)   05/07/19 119.2 kg (262 lb 12.8 oz)                    Reviewed and updated as needed this visit by Provider         Review of Systems   ROS COMP: Constitutional, HEENT, cardiovascular, pulmonary, gi and gu systems are negative, except as otherwise noted.      Objective    /86   Pulse 61   Temp 99  F (37.2  C) (Oral)   Resp 18   Ht 1.981 m (6' 6\")   Wt 114.6 kg (252 lb 9.6 oz)   SpO2 97%   BMI 29.19 kg/m    Body mass index is 29.19 kg/m .  Physical Exam   GENERAL: healthy, alert and no distress  RESP: lungs clear to auscultation - no rales, rhonchi or wheezes  CV: regular rate and rhythm, normal S1 S2, no S3 or S4, no murmur, click or rub, no peripheral edema and peripheral pulses strong  MS: no gross musculoskeletal defects noted, no edema  Comprehensive back pain exam:  Tenderness of left paralumbar muscles, Range of motion not limited by pain, Lower extremity strength functional and equal on both sides, Lower extremity reflexes within normal limits " bilaterally, Lower extremity sensation normal and equal on both sides and Straight leg raise negative bilaterally    Diagnostic Test Results:  none         Assessment & Plan     1. Acute left-sided low back pain with left-sided sciatica  Consider MRI in the future if not improving with below interventions.  X-ray to rule out fracture given trauma prior to pain starting.  - methylPREDNISolone (MEDROL DOSEPAK) 4 MG tablet therapy pack; Follow Package Directions  Dispense: 21 tablet; Refill: 0  - XR Lumbar Spine 2/3 Views; Future  - methocarbamol (ROBAXIN) 500 MG tablet; Take 1-2 tablets (500-1,000 mg) by mouth 3 times daily as needed  Dispense: 30 tablet; Refill: 1  - [facility] PT, HAND, AND CHIROPRACTIC REFERRAL; Future         FUTURE APPOINTMENTS:       - Follow-up for annual visit or as needed    No follow-ups on file.    JENNIFER Singer [facility]

## 2019-11-04 NOTE — LETTER
[facility]  [address] [place] [address]    November 5, 2019    James Rashid  [address]  [facility] [address]          Dear James,    Your x-ray does not show any fractures.  It does show moderate degenerative changes to your lumbar spine.  Please call clinic if you pain persists after physical therapy.     If you have any questions please feel free to contact [phone] or myself via [employer].     Enclosed is a copy of your results.     No results found for any visits on 11/04/19.    If you have any questions or concerns, please call myself or my nurse at [phone].      Sincerely,        Geovanna Still, CRUZ/hines

## 2019-11-05 NOTE — RESULT ENCOUNTER NOTE
Dear James,    Your recent test results are attached.      Your x-ray does not show any fractures.  It does show moderate degenerative changes to your lumbar spine.  Please call clinic if you pain persists after physical therapy.    If you have any questions please feel free to contact [phone] or myself via [employer].    Sincerely,  Geovanna Still, CNP

## 2019-11-11 ENCOUNTER — THERAPY VISIT (OUTPATIENT)
Dept: PHYSICAL THERAPY | Facility: CLINIC | Age: 59
End: 2019-11-11
Attending: NURSE PRACTITIONER
Payer: COMMERCIAL

## 2019-11-11 DIAGNOSIS — M54.42 ACUTE LEFT-SIDED LOW BACK PAIN WITH LEFT-SIDED SCIATICA: ICD-10-CM

## 2019-11-11 DIAGNOSIS — G89.29 CHRONIC LEFT-SIDED LOW BACK PAIN WITH LEFT-SIDED SCIATICA: Primary | ICD-10-CM

## 2019-11-11 DIAGNOSIS — M54.42 CHRONIC LEFT-SIDED LOW BACK PAIN WITH LEFT-SIDED SCIATICA: Primary | ICD-10-CM

## 2019-11-11 PROCEDURE — 97112 NEUROMUSCULAR REEDUCATION: CPT | Mod: GP | Performed by: PHYSICAL THERAPIST

## 2019-11-11 PROCEDURE — 97110 THERAPEUTIC EXERCISES: CPT | Mod: GP | Performed by: PHYSICAL THERAPIST

## 2019-11-11 PROCEDURE — 97161 PT EVAL LOW COMPLEX 20 MIN: CPT | Mod: GP | Performed by: PHYSICAL THERAPIST

## 2019-11-11 NOTE — PROGRESS NOTES
"[facility] Initial Evaluation  Subjective:  The history is provided by the patient. No  was used.                       Objective:  System   [facility] Initial Evaluation -- Lumbar    Date: November 11, 2019  James Rashid is a 59 year old male with a low back condition.   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#  Employment status:    Leisure mechanical stresses: None  Functional disability score (YANIRA/STarT Back):  YANIRA 24%  VAS score (0-10): 7/10  Patient goals/expectations:  To get more comfortable when driving; be able to travel to [place]    HISTORY:  Present symptoms: pain in left LB, left buttock, lower leg, and medial aspect of foot  Pain quality (sharp/shooting/stabbing/aching/burning/cramping):  \"tightness\"  Paresthesia (yes/no):  no    Present since (onset date): August 24, 2019  Symptoms (improving/unchanging/worsening):  unchanging     Symptoms commenced as a result of: trying to get up on water skis   Condition occurred in the following environment:   In the community     Symptoms at onset (back/thigh/leg): left posterior thigh, lower leg pain  Constant symptoms (back/thigh/leg): None  Intermittent symptoms (back/thigh/leg): left low back and LE    Symptoms are made worse with the following: Always Sitting, Always driving  Symptoms are made better with the following: Always Walking; Always standing    Disturbed sleep (yes/no):  No Sleeping postures (prone/sup/side R/L): left SL    Previous episodes (0/1-5/6-10/11+): 0 Year of first episode: NA    Previous history: Negative  Previous treatments: None      Specific Questions:    Cough/Sneeze/Strain (pos/neg): Neg  Bowel/Bladder (normal/abnormal): Normal  Gait (normal/abnormal): normal  Medications (nil/NSAIDS/analg/steroids/anticoag/other):  Muscle relaxants, Cardiac medication, " Steroids, and High blood pressure  Medical allergies:  none  General health (excellent/good/fair/poor):  Good  Pertinent medical history:  Heart problems and High blood pressure  Imaging (None/Xray/MRI/Other):  X-ray (negative)  Recent or major surgery (yes/no):  No  Night pain (yes/no): No  Accidents (yes/no): No  Unexplained weight loss (yes/no): No  Barriers at home: No  Other red flags: None    EXAMINATION    Posture:   Sitting (good/fair/poor): Poor  Standing (good/fair/poor):good  Lordosis (red/acc/normal): reduced  Correction of posture (better/worse/no effect): better    Lateral Shift (right/left/nil): Nil  Relevant (yes/no):  No  Other Observations: None    Neurological:    Motor deficit:  WNL  Reflexes:  WNL bilat LE  Sensory deficit: NT  Dural signs:  Negative Slump test, Negative SLR    Movement Loss:   Abran Mod Min Nil Pain   Flexion    X Left LB & post thigh pain   Extension  X      Side Gliding R    X None   Side Gliding L    X Left LBP     Test Movements:   During: produces, abolishes, increases, decreases, no effect, centralizing, peripheralizing   After: better, worse, no better, no worse, no effect, centralized, peripheralized    Pretest symptoms standing: Left LBP   Symptoms During Symptoms After ROM increased ROM decreased No Effect   FIS No Effect       Rep FIS Peripheralising    No Worse         EIS Decreases          Rep EIS Decreases    Better           Pretest symptoms lying: no pain    Symptoms During Symptoms After ROM increased ROM decreased No Effect   ESTRELLA        Rep ESTRELLA        EIL No Effect          Rep EIL No Effect    Better    X       Static Tests:  Sitting slouched:  NT     Sitting erect:  NT  Standing slouched NT     Standing erect:  NT  Lying prone in extension:  Produces LBP/No worse Long sitting:  NT    Other Tests: hip screen negative    Provisional Classification:  Derangement - Asymmetrical, unilateral, symptoms below knee    Principle of Management:  Education:  Neutral  sitting posture; use of lumbar roll when sitting and driving; centralization vs peripheralization; adjustment of truck seat closer to steering wheel to avoid dural tension     Equipment provided:  none  Mechanical therapy (Y/N):  Y   Extension principle:  Y  Lateral Principle:  N  Flexion principle:  N   Other:  None    ASSESSMENT/PLAN:    Patient is a 59 year old male with lumbar complaints.    Patient has the following significant findings with corresponding treatment plan.                Diagnosis 1:  LBP   Pain -  self management, education, directional preference exercise and home program  Decreased ROM/flexibility - therapeutic exercise  Decreased function - therapeutic activities and home program  Impaired posture - neuro re-education    Cumulative Therapy Evaluation is: Low complexity.    Previous and current functional limitations:  (See Goal Flow Sheet for this information)    Short term and Long term goals: (See Goal Flow Sheet for this information)     Communication ability:  Patient appears to be able to clearly communicate and understand verbal and written communication and follow directions correctly.  Treatment Explanation - The following has been discussed with the patient:   RX ordered/plan of care  Anticipated outcomes  Possible risks and side effects  This patient would benefit from PT intervention to resume normal activities.   Rehab potential is good.    Frequency:  1 X week, once daily  Duration:  for 6 weeks  Discharge Plan:  Achieve all LTG.  Independent in home treatment program.  Reach maximal therapeutic benefit.    Please refer to the daily flowsheet for treatment today, total treatment time and time spent performing 1:1 timed codes.             Physical Exam    General     ROS

## 2019-11-12 PROBLEM — M54.42 CHRONIC LEFT-SIDED LOW BACK PAIN WITH LEFT-SIDED SCIATICA: Status: ACTIVE | Noted: 2019-11-12

## 2019-11-12 PROBLEM — G89.29 CHRONIC LEFT-SIDED LOW BACK PAIN WITH LEFT-SIDED SCIATICA: Status: ACTIVE | Noted: 2019-11-12

## 2020-01-02 ENCOUNTER — OFFICE VISIT (OUTPATIENT)
Dept: FAMILY MEDICINE | Facility: CLINIC | Age: 60
End: 2020-01-02
Payer: COMMERCIAL

## 2020-01-02 VITALS
SYSTOLIC BLOOD PRESSURE: 132 MMHG | RESPIRATION RATE: 18 BRPM | DIASTOLIC BLOOD PRESSURE: 70 MMHG | TEMPERATURE: 97.5 F | WEIGHT: 251 LBS | OXYGEN SATURATION: 98 % | HEART RATE: 55 BPM | BODY MASS INDEX: 29.01 KG/M2

## 2020-01-02 DIAGNOSIS — I48.0 PAROXYSMAL ATRIAL FIBRILLATION (H): Primary | ICD-10-CM

## 2020-01-02 DIAGNOSIS — Z72.51 UNPROTECTED SEX: ICD-10-CM

## 2020-01-02 DIAGNOSIS — I10 BENIGN ESSENTIAL HYPERTENSION: ICD-10-CM

## 2020-01-02 LAB
ANION GAP SERPL CALCULATED.3IONS-SCNC: 3 MMOL/L (ref 3–14)
AST SERPL W P-5'-P-CCNC: 12 U/L (ref 0–45)
BUN SERPL-MCNC: 18 MG/DL (ref 7–30)
CALCIUM SERPL-MCNC: 9.2 MG/DL (ref 8.5–10.1)
CHLORIDE SERPL-SCNC: 109 MMOL/L (ref 94–109)
CHOLEST SERPL-MCNC: 163 MG/DL
CO2 SERPL-SCNC: 30 MMOL/L (ref 20–32)
CREAT SERPL-MCNC: 0.98 MG/DL (ref 0.66–1.25)
GFR SERPL CREATININE-BSD FRML MDRD: 83 ML/MIN/{1.73_M2}
GLUCOSE SERPL-MCNC: 89 MG/DL (ref 70–99)
HCV AB SERPL QL IA: NONREACTIVE
HDLC SERPL-MCNC: 52 MG/DL
LDLC SERPL CALC-MCNC: 95 MG/DL
NONHDLC SERPL-MCNC: 111 MG/DL
POTASSIUM SERPL-SCNC: 4.8 MMOL/L (ref 3.4–5.3)
SODIUM SERPL-SCNC: 142 MMOL/L (ref 133–144)
TRIGL SERPL-MCNC: 82 MG/DL
TSH SERPL DL<=0.005 MIU/L-ACNC: 2.8 MU/L (ref 0.4–4)

## 2020-01-02 PROCEDURE — 84450 TRANSFERASE (AST) (SGOT): CPT | Performed by: PHYSICIAN ASSISTANT

## 2020-01-02 PROCEDURE — 86803 HEPATITIS C AB TEST: CPT | Performed by: PHYSICIAN ASSISTANT

## 2020-01-02 PROCEDURE — 36415 COLL VENOUS BLD VENIPUNCTURE: CPT | Performed by: PHYSICIAN ASSISTANT

## 2020-01-02 PROCEDURE — 84443 ASSAY THYROID STIM HORMONE: CPT | Performed by: PHYSICIAN ASSISTANT

## 2020-01-02 PROCEDURE — 80061 LIPID PANEL: CPT | Performed by: PHYSICIAN ASSISTANT

## 2020-01-02 PROCEDURE — 80048 BASIC METABOLIC PNL TOTAL CA: CPT | Performed by: PHYSICIAN ASSISTANT

## 2020-01-02 PROCEDURE — 99214 OFFICE O/P EST MOD 30 MIN: CPT | Performed by: PHYSICIAN ASSISTANT

## 2020-01-02 PROCEDURE — 93000 ELECTROCARDIOGRAM COMPLETE: CPT | Performed by: PHYSICIAN ASSISTANT

## 2020-01-02 RX ORDER — LISINOPRIL 5 MG/1
5 TABLET ORAL DAILY
Qty: 90 TABLET | Refills: 4 | Status: SHIPPED | OUTPATIENT
Start: 2020-01-02 | End: 2021-02-19

## 2020-01-02 RX ORDER — AMIODARONE HYDROCHLORIDE 200 MG/1
200 TABLET ORAL DAILY
Qty: 90 TABLET | Refills: 1 | Status: SHIPPED | OUTPATIENT
Start: 2020-01-02 | End: 2024-05-29

## 2020-01-02 NOTE — PROGRESS NOTES
Subjective     James Rashid is a 59 year old male who presents to clinic today for the following health issues:    HPI   Hypertension Follow-up      Do you check your blood pressure regularly outside of the clinic? No     Are you following a low salt diet? Yes    Are your blood pressures ever more than 140 on the top number (systolic) OR more   than 90 on the bottom number (diastolic), for example 140/90? No      How many servings of fruits and vegetables do you eat daily?  2-3    On average, how many sweetened beverages do you drink each day (Examples: soda, juice, sweet tea, etc.  Do NOT count diet or artificially sweetened beverages)?   1 can of soda    How many days per week do you miss taking your medication? 0    Medication Followup of amiodarone (PACERONE/CODARONE) 200 MG tablet    Taking Medication as prescribed: yes    Side Effects:  None    Medication Helping Symptoms:  yes       Review of Systems   ROS COMP: Constitutional, HEENT, cardiovascular, pulmonary, gi and gu systems are negative, except as otherwise noted.      Objective    /70   Pulse 55   Temp 97.5  F (36.4  C) (Oral)   Resp 18   Wt 113.9 kg (251 lb)   SpO2 98%   BMI 29.01 kg/m    Body mass index is 29.01 kg/m .  Physical Exam   GENERAL: healthy, alert and no distress  NECK: no adenopathy, no asymmetry, masses, or scars and thyroid normal to palpation  RESP: lungs clear to auscultation - no rales, rhonchi or wheezes  CV: regular rate and rhythm, normal S1 S2, no S3 or S4, no murmur, click or rub, no peripheral edema and peripheral pulses strong  MS: no gross musculoskeletal defects noted, no edema    Diagnostic Test Results:  Labs reviewed in Epic        Assessment & Plan     1. Benign essential hypertension  - lisinopril (PRINIVIL/ZESTRIL) 5 MG tablet; Take 1 tablet (5 mg) by mouth daily  Dispense: 90 tablet; Refill: 4  - amiodarone (PACERONE/CODARONE) 200 MG tablet; Take 1 tablet (200 mg) by mouth daily  Dispense: 90 tablet;  Refill: 1  - Basic metabolic panel  (Ca, Cl, CO2, Creat, Gluc, K, Na, BUN)    2. Paroxysmal atrial fibrillation (H)  - TSH  - AST  - EKG 12-lead complete w/read - Clinics     Follow up per cardiology  Schedule CPE.      Joselin Bhakta PA-C  [facility]

## 2020-01-02 NOTE — LETTER
[facility]  [address] [place] [address]    January 7, 2020    James Rashid  [address]  [place] [address]          Dear Malcolm Ramirez is a copy of your results.         These are normal results.  Follow up as previously recommended.          Results for orders placed or performed in visit on 01/02/20   TSH     Status: None   Result Value Ref Range    TSH 2.80 0.40 - 4.00 mU/L   AST     Status: None   Result Value Ref Range    AST 12 0 - 45 U/L   Basic metabolic panel  (Ca, Cl, CO2, Creat, Gluc, K, Na, BUN)     Status: None   Result Value Ref Range    Sodium 142 133 - 144 mmol/L    Potassium 4.8 3.4 - 5.3 mmol/L    Chloride 109 94 - 109 mmol/L    Carbon Dioxide 30 20 - 32 mmol/L    Anion Gap 3 3 - 14 mmol/L    Glucose 89 70 - 99 mg/dL    Urea Nitrogen 18 7 - 30 mg/dL    Creatinine 0.98 0.66 - 1.25 mg/dL    GFR Estimate 83 >60 mL/min/[1.73_m2]    GFR Estimate If Black >90 >60 mL/min/[1.73_m2]    Calcium 9.2 8.5 - 10.1 mg/dL   Lipid panel reflex to direct LDL Fasting     Status: None   Result Value Ref Range    Cholesterol 163 <200 mg/dL    Triglycerides 82 <150 mg/dL    HDL Cholesterol 52 >39 mg/dL    LDL Cholesterol Calculated 95 <100 mg/dL    Non HDL Cholesterol 111 <130 mg/dL   Hepatitis C antibody     Status: None   Result Value Ref Range    Hepatitis C Antibody Nonreactive NR^Nonreactive       If you have any questions or concerns, please me or my clinic team at [phone].      Sincerely,        Joselin Bhakta PA-C/bt

## 2020-01-23 PROBLEM — M54.42 CHRONIC LEFT-SIDED LOW BACK PAIN WITH LEFT-SIDED SCIATICA: Status: RESOLVED | Noted: 2019-11-12 | Resolved: 2020-01-23

## 2020-01-23 PROBLEM — G89.29 CHRONIC LEFT-SIDED LOW BACK PAIN WITH LEFT-SIDED SCIATICA: Status: RESOLVED | Noted: 2019-11-12 | Resolved: 2020-01-23

## 2021-02-17 DIAGNOSIS — I10 BENIGN ESSENTIAL HYPERTENSION: ICD-10-CM

## 2021-02-19 RX ORDER — LISINOPRIL 5 MG/1
5 TABLET ORAL DAILY
Qty: 30 TABLET | Refills: 0 | Status: SHIPPED | OUTPATIENT
Start: 2021-02-19 | End: 2021-12-10

## 2021-03-14 ENCOUNTER — TELEPHONE (OUTPATIENT)
Dept: FAMILY MEDICINE | Facility: CLINIC | Age: 61
End: 2021-03-14

## 2021-03-14 DIAGNOSIS — I10 BENIGN ESSENTIAL HYPERTENSION: ICD-10-CM

## 2021-03-15 RX ORDER — LISINOPRIL 5 MG/1
TABLET ORAL
Qty: 30 TABLET | Refills: 0 | OUTPATIENT
Start: 2021-03-15

## 2021-03-15 NOTE — TELEPHONE ENCOUNTER
Routing refill request to provider for review/approval because:  Elis given x1 and patient did not follow up, please advise  Patient needs to be seen because it has been more than 1 year since last office visit.

## 2021-03-15 NOTE — TELEPHONE ENCOUNTER
Please have patient schedule an appointment and then send 30 day refill back.  Jessica Quintanilla, APRN, CNP

## 2021-03-16 NOTE — TELEPHONE ENCOUNTER
Patient returned call and was informed of message. Patient said that he has quite of bit of the Lisinopril (had gotten prescriptions from his cardiologist when he went in for his yearly check about a month ago).  He said he should be good for about three months.    Advised him to contact his cardiologist for the next fill, and if they are unable to fill it, patient would need to schedule appointment. Patient was in agreement with this.    Manjula Frank,

## 2021-03-16 NOTE — TELEPHONE ENCOUNTER
Called and left message for patient to return call to clinic.    -if patient return call to clinic please help patient schedule for an appointment for BP check then please sen to Jessica Quintanilla to give kumar refill for 30 days.  Belle Masters, CMA

## 2021-06-27 DIAGNOSIS — I10 BENIGN ESSENTIAL HYPERTENSION: ICD-10-CM

## 2021-06-27 NOTE — LETTER
July 12, 2021      James Rashid  [address]  [facility] [address]        Dear James,     Your provider has denied your refill of LISINOPRIL 5 MG TABLET. You are due for an appointment for further refills. Please contact the clinic to schedule an appointment for further refills.        Sincerely,        Joselin Bhakta PA-C

## 2021-06-28 NOTE — TELEPHONE ENCOUNTER
"Routing refill request to provider for review/approval because:  Labs not current:  Cr, K, BP    Team, please call pt to schedule. See refill encounter from 3/14/21- needs appt scheduled, then ok to send 1 mo refill.      Requested Prescriptions   Pending Prescriptions Disp Refills     lisinopril (ZESTRIL) 5 MG tablet [Pharmacy Med Name: LISINOPRIL 5 MG TABLET] 30 tablet 0     Sig: TAKE 1 TABLET BY MOUTH EVERY DAY       ACE Inhibitors (Including Combos) Protocol Failed - 6/27/2021  8:45 PM        Failed - Blood pressure under 140/90 in past 12 months     BP Readings from Last 3 Encounters:   01/02/20 132/70   11/04/19 132/86   09/06/19 122/70                 Failed - Recent (12 mo) or future (30 days) visit within the authorizing provider's specialty     Patient has had an office visit with the authorizing provider or a provider within the authorizing providers department within the previous 12 mos or has a future within next 30 days. See \"Patient Info\" tab in inbasket, or \"Choose Columns\" in Meds & Orders section of the refill encounter.              Failed - Normal serum creatinine on file in past 12 months     Recent Labs   Lab Test 01/02/20  0924   CR 0.98       Ok to refill medication if creatinine is low          Failed - Normal serum potassium on file in past 12 months     Recent Labs   Lab Test 01/02/20  0924   POTASSIUM 4.8             Passed - Medication is active on med list        Passed - Patient is age 18 or older           Kailey Euceda RN  [facility]      "

## 2021-07-01 NOTE — TELEPHONE ENCOUNTER
Called patient and left VM to call clinic. Please help schedule med check appointment if patient returns call.  Kailey BISWAS CMA ([facility])

## 2021-07-09 NOTE — TELEPHONE ENCOUNTER
Attempt 2, Called patient and left VM to call clinic. Please help schedule med check appointment if patient returns call.  Kailey BISWAS CMA ([facility])

## 2021-07-12 RX ORDER — LISINOPRIL 5 MG/1
TABLET ORAL
Qty: 30 TABLET | Refills: 0 | OUTPATIENT
Start: 2021-07-12

## 2021-11-22 ENCOUNTER — TRANSFERRED RECORDS (OUTPATIENT)
Dept: HEALTH INFORMATION MANAGEMENT | Facility: CLINIC | Age: 61
End: 2021-11-22
Payer: COMMERCIAL

## 2022-01-12 ENCOUNTER — LAB (OUTPATIENT)
Dept: URGENT CARE | Facility: URGENT CARE | Age: 62
End: 2022-01-12
Payer: COMMERCIAL

## 2022-01-12 DIAGNOSIS — Z20.822 SUSPECTED COVID-19 VIRUS INFECTION: ICD-10-CM

## 2022-01-12 PROCEDURE — 99000 SPECIMEN HANDLING OFFICE-LAB: CPT

## 2022-01-12 PROCEDURE — U0003 INFECTIOUS AGENT DETECTION BY NUCLEIC ACID (DNA OR RNA); SEVERE ACUTE RESPIRATORY SYNDROME CORONAVIRUS 2 (SARS-COV-2) (CORONAVIRUS DISEASE [COVID-19]), AMPLIFIED PROBE TECHNIQUE, MAKING USE OF HIGH THROUGHPUT TECHNOLOGIES AS DESCRIBED BY CMS-2020-01-R: HCPCS | Mod: 90

## 2022-01-12 PROCEDURE — U0005 INFEC AGEN DETEC AMPLI PROBE: HCPCS | Mod: 90

## 2022-01-13 LAB — SARS-COV-2 RNA RESP QL NAA+PROBE: DETECTED

## 2022-01-14 ENCOUNTER — TELEPHONE (OUTPATIENT)
Dept: URGENT CARE | Facility: URGENT CARE | Age: 62
End: 2022-01-14
Payer: COMMERCIAL

## 2022-01-14 NOTE — TELEPHONE ENCOUNTER
"Coronavirus (COVID-19) Notification    Caller Name (Patient, parent, daughter/son, grandparent, etc)  The patient      Reason for call  Notify of Positive Coronavirus (COVID-19) lab results, assess symptoms,  review  [facility] recommendations    Lab Result    Lab test:  2019-nCoV rRt-PCR or SARS-CoV-2 PCR    Oropharyngeal AND/OR nasopharyngeal swabs is POSITIVE for 2019-nCoV RNA/SARS-COV-2 PCR (COVID-19 virus)    RN Recommendations/Instructions per [facility] Coronavirus COVID-19 recommendations    Brief introduction script  Introduce self then review script:  \"I am calling on behalf of [employer].  We were notified that your Coronavirus test (COVID-19) for was POSITIVE for the virus.  I have some information to relay to you but first I wanted to mention that the [place] Dept of Health will be contacting you shortly [it's possible MD already called Patient] to talk to you more about how you are feeling and other people you have had contact with who might now also have the virus.  Also,  [employer] is Partnering with the [facility] for Covid-19 research, you may be contacted directly by research staff.\"    Assessment (Inquire about Patient's current symptoms)   Assessment   Current Symptoms at time of phone call: (if no symptoms, document No symptoms] Head cold symptom, forehead sinus pressure, slight cough   Symptoms onset (if applicable) 3 days ago     If at time of call, Patients symptoms hare worsened, the Patient should contact 911 or have someone drive them to Emergency Dept promptly:      If Patient calling 911, inform 911 personal that you have tested positive for the Coronavirus (COVID-19).  Place mask on and await 911 to arrive.    If Emergency Dept, If possible, please have another adult drive you to the Emergency Dept but you need to wear mask when in contact with other people.      Monoclonal Antibody Administration    You may be eligible to receive a new treatment with a " monoclonal antibody for preventing hospitalization in patients at high risk for complications from COVID-19.   This medication is still experimental and available on a limited basis; it is given through an IV and must be given at an infusion center. Please note that not all people who are eligible will receive the medication since it is in limited supply.     Are you interested in being considered for this medication?  No.   Does the patient fit the criteria: Patient declined    Review information with Patient    Your result was positive. This means you have COVID-19 (coronavirus).  We have sent you a letter that reviews the information that I'll be reviewing with you now.    How can I protect others?    If you have symptoms: stay home and away from others (self-isolate) until:    You've had no fever--and no medicine that reduces fever--for 1 full day (24 hours). And       Your other symptoms have gotten better. For example, your cough or breathing has improved. And     At least 10 days have passed since your symptoms started. (If you've been told by a doctor that you have a weak immune system, wait 20 days.)     If you don't have symptoms: Stay home and away from others (self-isolate) until at least 10 days have passed since your first positive COVID-19 test. (Date test collected)    During this time:    Stay in your own room, including for meals. Use your own bathroom if you can.    Stay away from others in your home. No hugging, kissing or shaking hands. No visitors.     Don't go to work, school or anywhere else.     Clean  high touch  surfaces often (doorknobs, counters, handles, etc.). Use a household cleaning spray or wipes. You'll find a full list on the EPA website at www.epa.gov/pesticide-registration/list-n-disinfectants-use-against-sars-cov-2.     Cover your mouth and nose with a mask, tissue or other face covering to avoid spreading germs.    Wash your hands and face often with soap and water.    Make a  list of people you have been in close contact with recently, even if either of you wore a face covering.   - Start your list from 2 days before you became ill or had a positive test.  - Include anyone that was within 6 feet of you for a cumulative total of 15 minutes or more in 24 hours. (Example: if you sat next to Nilesh for 5 minutes in the morning and 10 minutes in the afternoon, then you were in close contact for 15 minutes total that day. Nilesh would be added to your list.)    A public health worker will call or text you. It is important that you answer. They will ask you questions about possible exposures to COVID-19, such as people you have been in direct contact with and places you have visited.    Tell the people on your list that you have COVID-19; they should stay away from others for 14 days starting from the last time they were in contact with you (unless you are told something different from a public health worker).     Caregivers in these groups are at risk for severe illness due to COVID-19:  o People 65 years and older  o People who live in a nursing home or long-term care facility  o People with chronic disease (lung, heart, cancer, diabetes, kidney, liver, immunologic)  o People who have a weakened immune system, including those who:  - Are in cancer treatment  - Take medicine that weakens the immune system, such as corticosteroids  - Had a bone marrow or organ transplant  - Have an immune deficiency  - Have poorly controlled HIV or AIDS  - Are obese (body mass index of 40 or higher)  - Smoke regularly    Caregivers should wear gloves while washing dishes, handling laundry and cleaning bedrooms and bathrooms.    Wash and dry laundry with special caution. Don't shake dirty laundry, and use the warmest water setting you can.    If you have a weakened immune system, ask your doctor about other actions you should take.    For more tips, go to  www.cdc.gov/coronavirus/2019-ncov/downloads/10Things.pdf.    You should not go back to work until you meet the guidelines above for ending your home isolation. You don't need to be retested for COVID-19 before going back to work--studies show that you won't spread the virus if it's been at least 10 days since your symptoms started (or 20 days, if you have a weak immune system).    Employers: This document serves as formal notice of your employee's medical guidelines for going back to work. They must meet the above guidelines before going back to work in person.    How can I take care of myself?    1. Get lots of rest. Drink extra fluids (unless a doctor has told you not to).    2. Take Tylenol (acetaminophen) for fever or pain. If you have liver or kidney problems, ask your family doctor if it's okay to take Tylenol.     Take either:     650 mg (two 325 mg pills) every 4 to 6 hours, or     1,000 mg (two 500 mg pills) every 8 hours as needed.     Note: Don't take more than 3,000 mg in one day. Acetaminophen is found in many medicines (both prescribed and over-the-counter medicines). Read all labels to be sure you don't take too much.    For children, check the Tylenol bottle for the right dose (based on their age or weight).    3. If you have other health problems (like cancer, heart failure, an organ transplant or severe kidney disease): Call your specialty clinic if you don't feel better in the next 2 days.    4. Know when to call 911: Emergency warning signs include:    Trouble breathing or shortness of breath    Pain or pressure in the chest that doesn't go away    Feeling confused like you haven't felt before, or not being able to wake up    Bluish-colored lips or face    5. Sign up for [employer]. We know it's scary to hear that you have COVID-19. We want to track your symptoms to make sure you're okay over the next 2 weeks. Please look for an email from [employer]--this is a free, online program that we'll  use to keep in touch. To sign up, follow the link in the email. Learn more at [email or web address].    Where can I get more information?    [facility]: [email or web address]    Coronavirus Basics: www.health.[facility].[place]/diseases/coronavirus/basics.html    What to Do If You're Sick: www.cdc.gov/coronavirus/2019-ncov/about/steps-when-sick.html    Ending Home Isolation: www.cdc.gov/coronavirus/2019-ncov/hcp/disposition-in-home-patients.html     Caring for Someone with COVID-19: www.cdc.gov/coronavirus/2019-ncov/if-you-are-sick/care-for-someone.html     [facility] clinical trials (COVID-19 research studies): clinicalaffairs.[facility]-clinical-trials     A Positive COVID-19 letter will be sent via [employer] or the mail. (Exception, no letters sent to Presurgerical/Preprocedure Patients)    Nga Severino LPN

## 2022-03-29 ENCOUNTER — OFFICE VISIT (OUTPATIENT)
Dept: UROLOGY | Facility: CLINIC | Age: 62
End: 2022-03-29
Payer: COMMERCIAL

## 2022-03-29 VITALS
SYSTOLIC BLOOD PRESSURE: 137 MMHG | OXYGEN SATURATION: 98 % | HEART RATE: 78 BPM | TEMPERATURE: 98.2 F | DIASTOLIC BLOOD PRESSURE: 86 MMHG

## 2022-03-29 DIAGNOSIS — R97.20 ELEVATED PROSTATE SPECIFIC ANTIGEN (PSA): ICD-10-CM

## 2022-03-29 DIAGNOSIS — N40.1 BENIGN PROSTATIC HYPERPLASIA WITH LOWER URINARY TRACT SYMPTOMS, SYMPTOM DETAILS UNSPECIFIED: Primary | ICD-10-CM

## 2022-03-29 LAB
ALBUMIN UR-MCNC: NEGATIVE MG/DL
APPEARANCE UR: CLEAR
BILIRUB UR QL STRIP: NEGATIVE
COLOR UR AUTO: YELLOW
GLUCOSE UR STRIP-MCNC: NEGATIVE MG/DL
HGB UR QL STRIP: NEGATIVE
KETONES UR STRIP-MCNC: NEGATIVE MG/DL
LEUKOCYTE ESTERASE UR QL STRIP: NEGATIVE
NITRATE UR QL: NEGATIVE
PH UR STRIP: 7.5 [PH] (ref 5–7)
PSA SERPL-MCNC: 4.46 UG/L (ref 0–4)
SP GR UR STRIP: 1.01 (ref 1–1.03)
UROBILINOGEN UR STRIP-ACNC: 0.2 E.U./DL

## 2022-03-29 PROCEDURE — 81003 URINALYSIS AUTO W/O SCOPE: CPT | Performed by: UROLOGY

## 2022-03-29 PROCEDURE — 51798 US URINE CAPACITY MEASURE: CPT | Performed by: UROLOGY

## 2022-03-29 PROCEDURE — 84153 ASSAY OF PSA TOTAL: CPT | Performed by: UROLOGY

## 2022-03-29 PROCEDURE — 99204 OFFICE O/P NEW MOD 45 MIN: CPT | Mod: 25 | Performed by: UROLOGY

## 2022-03-29 PROCEDURE — 36415 COLL VENOUS BLD VENIPUNCTURE: CPT | Performed by: UROLOGY

## 2022-03-29 RX ORDER — FINASTERIDE 5 MG/1
5 TABLET, FILM COATED ORAL DAILY
Qty: 90 TABLET | Refills: 3 | Status: SHIPPED | OUTPATIENT
Start: 2022-03-29 | End: 2023-01-24

## 2022-03-29 RX ORDER — TAMSULOSIN HYDROCHLORIDE 0.4 MG/1
0.4 CAPSULE ORAL AT BEDTIME
Qty: 90 CAPSULE | Refills: 3 | Status: SHIPPED | OUTPATIENT
Start: 2022-03-29 | End: 2024-01-09

## 2022-03-29 NOTE — PROGRESS NOTES
Average Daily intake:  1 glass Coffee/tea  1 bottle Water    Bladder Scan performed. 25ml maximum residual urine detected after 3 scans. MD lopez.    Lashawn TOVAR RN Urology 3/29/2022 8:16 AM

## 2022-03-29 NOTE — PROGRESS NOTES
S: James Rashid is a pleasant  61 year old male who was seen for a consult with regard to patient's urinary complaints.  Patient complains of Hesitancy in starting urinary stream, Sensation of incomplete emptying, Strain to Urinate, Nocturia x 4, Frequency and slow urinary stream.  He has no history of elevated PSA.  Symptoms have been on going for   many years(s).  Seems to be worsened over time.  His recent PSA was found to be   PSA   Date Value Ref Range Status   09/04/2013 3.88 0 - 4 ug/L Final   .  His AUA Symptom Score:  24.  His QOL score:  4.  He has no dysuria or hematuria.  He was on flomax many years ago but has not been taking it.      Current Outpatient Medications   Medication Sig Dispense Refill     finasteride (PROSCAR) 5 MG tablet Take 1 tablet (5 mg) by mouth daily 90 tablet 3     tamsulosin (FLOMAX) 0.4 MG capsule Take 1 capsule (0.4 mg) by mouth At Bedtime 90 capsule 3     amiodarone (PACERONE/CODARONE) 200 MG tablet Take 1 tablet (200 mg) by mouth daily 90 tablet 1     aspirin (ASA) 325 MG EC tablet Take 325 mg by mouth daily       lisinopril (ZESTRIL) 5 MG tablet Take 1 tablet (5 mg) by mouth daily Needs to follow up MD 10 tablet 0     methocarbamol (ROBAXIN) 500 MG tablet Take 1-2 tablets (500-1,000 mg) by mouth 3 times daily as needed 30 tablet 1     methylPREDNISolone (MEDROL DOSEPAK) 4 MG tablet therapy pack Follow Package Directions 21 tablet 0     No Known Allergies  Past Medical History:   Diagnosis Date     Adenomatous polyp 11/2016     Atrial fibrillation (H) 4/12     Epicondylitis, medial humeral 3/24/2014     Hypertension      Sleep apnea      Past Surgical History:   Procedure Laterality Date     COLONOSCOPY  11/2016    Q 5 years, polyps     SURGICAL HISTORY OF -   7/28/15    cardioversion for A Fib      Family History   Problem Relation Age of Onset     Hypertension Father      Asthma Son      Cancer No family hx of      Diabetes No family hx of      Thyroid Disease No family  hx of      Cerebrovascular Disease No family hx of      Glaucoma No family hx of      Macular Degeneration No family hx of      He does have a family history of prostate cancer.  Social History     Socioeconomic History     Marital status:      Spouse name: None     Number of children: None     Years of education: None     Highest education level: None   Occupational History     None   Tobacco Use     Smoking status: Never Smoker     Smokeless tobacco: Never Used   Substance and Sexual Activity     Alcohol use: Yes     Comment: occasional     Drug use: No     Sexual activity: Yes     Partners: Female   Other Topics Concern     Parent/sibling w/ CABG, MI or angioplasty before 65F 55M? Not Asked   Social History Narrative     None     Social Determinants of Health     Financial Resource Strain: Not on file   Food Insecurity: Not on file   Transportation Needs: Not on file   Physical Activity: Not on file   Stress: Not on file   Social Connections: Not on file   Intimate Partner Violence: Not on file   Housing Stability: Not on file        REVIEW OF SYSTEMS  =================  C: NEGATIVE for fever, chills, change in weight  I: NEGATIVE for worrisome rashes, moles or lesions  E/M: NEGATIVE for ear, mouth and throat problems  R: NEGATIVE for significant cough or SHORTNESS OF BREATH  CV:  NEGATIVE for chest pain, palpitations or peripheral edema  GI: NEGATIVE for nausea, abdominal pain, heartburn, or change in bowel habits  NEURO: NEGATIVE numbness/weakness  : see HPI  PSYCH: NEGATIVE depression/anxiety  LYmph: no new enlarged lymph nodes  Ortho: no new trauma/movements           O: Exam:/86 (BP Location: Right arm, Patient Position: Chair, Cuff Size: Adult Regular)   Pulse 78   Temp 98.2  F (36.8  C) (Oral)   SpO2 98%    Constitutional: healthy, alert and no distress  Cardiovascular: negative, PMI normal.   Respiratory: negative, no evidence of respiratory distress  Gastrointestinal: Abdomen soft,  non-tender. BS normal. No masses, organomegaly  : penis no discharge.  Testis no masses.  No scrotal skin lesion.  Prostate large apex only.    Musculoskeletal: extremities normal- no gross deformities noted, gait normal and normal muscle tone  Skin: no suspicious lesions or rashes  Neurologic: Alert and oriented  Psychiatric: mentation appears normal. and affect normal/bright  Hematologic/Lymphatic/Immunologic: normal ant/post cervical, axillary, supraclavicular and inguinal nodes    Assessment/Plan:   (N40.1) Benign prostatic hyperplasia with lower urinary tract symptoms, symptom details unspecified  (primary encounter diagnosis)  Comment: PVR 25 ml.  Plan: UA/PSA today    Recommendations:   Medical management versus surgical management versus office treatments, were discussed with patient at length. Pros and Cons discussed.   Trial of flomax/proscar.  Side effects discussed. RTC in 3-4 months for recheck.

## 2022-03-31 NOTE — PATIENT INSTRUCTIONS
Prostate Ultrasound Instructions  Dr. Cardozo  [address] [facility] [address]  [phone]      Appointment Date: 05/20/22   Time: 0815AM    ? Take antibiotics as directed on label. START THE MORNING BEFORE THE BIOPSY   ? 1 Fleets enema to be done 1    - 2 hours before procedure   ? NOTHING BY MOUTH AFTER THE ENEMA   ? If you are taking blood thinners (Aspirin, ibuprofen, Aleve) this is to be stopped ONE WEEK before the procedure. Tylenol is ok. If you are taking Coumadin, you must check with your primary doctor for further instructions  ? Please follow up in the office with the doctor ONE WEEK after the procedure to go over results      Follow up appointment:  Date: 5/27/22 Time:  0800AM    PROSTATE BIOPSY  Patient information:  You have had an examination of the prostate gland called a MAGALY (digital rectal examination) where a finger was inserted into your rectum to enable the doctor to feel your prostate gland. You may also have had a PSA (prostate specific antigen) blood test.  Sometimes an elevated PSA level and an abnormal MAGALY can be signs of prostate cancer. The doctor has recommended that you have a prostate biopsy.  What is a prostate biopsy:  You will be positioned for the biopsy as preferred by the physician.  An ultrasound probe is inserted into the rectum and a needle is then passed through the wall of the rectum and an injection of local anesthetic is given. Following the injection of local anesthetic, a needle will be inserted into the prostate gland to take a sample of cells. The doctor will usually take 12 separate biopsies.  You can hear some clicking sounds from the biopsy instrument whilst the biopsies are taken.  The procedure will take approximately five to ten minutes.   What are the risks?  ? Infection: As there is a chance of infection we give you antibiotics before the procedure to reduce the risk. However is you experience a fever, chills, nausea, or just not feeling well up to 72  hours after the biopsy you need to go directly to the emergency room.   ? Antibiotics: The physician will send a prescription to your preferred pharmacy. You will follow the directions on the bottle. Start the antibiotics on THE MORNING BEFORE THE BIOPSY.   ? Bleeding: Some men will see blood in the urine, semen and stool.  Bleeding can last for 6-8 weeks intermittently.  If you take any medication such as aspirin, warfarin, clopidogrel then the risk of bleeding will be higher.   ? Retention: Some men are unable to pass urine after the biopsy. This is called urine retention. This is very rare.  If you are unable to urinate please call our clinic or go to urgent care or the emergency room.   Getting the results:  The biopsy samples will be sent to a laboratory for examination by a pathologist.  It can take up to one week, sometimes longer, for your doctor to receive the results.  You will be given an appointment to return to clinic for the results of your biopsy within 1-2 weeks. If you do not receive an appointment for the results of the biopsy at the time of the scheduling, please contact the clinic to arrange an appointment for the results.   WE ARE NOT IN ANY CIRCUMSTANCES PERMITTED TO GIVE RESULTS OVER THE PHONE    What to expect following a biopsy:  You are advised to take it easy for the remainder of the day.  You may eat and drink as normal.  No restrictions to your normal daily routine.  As there is a risk of infection you will be given a course of antibiotics.  PLEASE COMPLETE THE FULL COURSE AS INSTRUCTED   Contact information:  Please call the clinic with any further questions [phone]   *Do not leave an urgent message on this voicemail as we may not receive it until the following business day*    \

## 2022-04-01 RX ORDER — LEVOFLOXACIN 500 MG/1
500 TABLET, FILM COATED ORAL DAILY
Qty: 3 TABLET | Refills: 0 | Status: SHIPPED | OUTPATIENT
Start: 2022-04-01 | End: 2024-05-29

## 2022-05-20 ENCOUNTER — OFFICE VISIT (OUTPATIENT)
Dept: UROLOGY | Facility: CLINIC | Age: 62
End: 2022-05-20
Payer: COMMERCIAL

## 2022-05-20 DIAGNOSIS — R97.20 ELEVATED PROSTATE SPECIFIC ANTIGEN (PSA): Primary | ICD-10-CM

## 2022-05-20 PROCEDURE — 96372 THER/PROPH/DIAG INJ SC/IM: CPT | Mod: 59 | Performed by: UROLOGY

## 2022-05-20 PROCEDURE — 55700 PR BIOPSY OF PROSTATE,NEEDLE/PUNCH: CPT | Performed by: UROLOGY

## 2022-05-20 PROCEDURE — 76942 ECHO GUIDE FOR BIOPSY: CPT | Performed by: UROLOGY

## 2022-05-20 PROCEDURE — 88305 TISSUE EXAM BY PATHOLOGIST: CPT | Performed by: PATHOLOGY

## 2022-05-20 RX ORDER — GENTAMICIN 40 MG/ML
80 INJECTION, SOLUTION INTRAMUSCULAR; INTRAVENOUS ONCE
Status: COMPLETED | OUTPATIENT
Start: 2022-05-20 | End: 2022-05-20

## 2022-05-20 RX ADMIN — GENTAMICIN 80 MG: 40 INJECTION, SOLUTION INTRAMUSCULAR; INTRAVENOUS at 08:23

## 2022-05-20 NOTE — PROGRESS NOTES
Clinic Administered Medication Documentation    Administrations This Visit     gentamicin (GARAMYCIN) injection 80 mg     Admin Date  05/20/2022 Action  New Bag Dose  80 mg Route  Intramuscular Site  Right Ventrogluteal Administered By  Sharron Gould RN    Ordering Provider: James Cardozo MD    Patient Supplied?: No    Comments: [id number]  [id number]  exp3/31/23              Sharron SHIN RN Specialty Triage 5/20/2022 8:24 AM

## 2022-05-20 NOTE — PROGRESS NOTES
Patient is here for prostate biopsy.  He was placed in the dorsal lithotomy position.  Prostate ultrasound placed transrectally.  The neurovascular bundle was anesthetized using 1% lidocaine.  Prostate measurements obtained.  Prostate size is 150 cc.  12 biopsies obtained under guidance of prostate ultrasound using biopsy needle.  Patient tolerated procedure.  Return to clinic in one week for biopsy result.

## 2022-05-24 LAB
PATH REPORT.COMMENTS IMP SPEC: NORMAL
PATH REPORT.COMMENTS IMP SPEC: NORMAL
PATH REPORT.FINAL DX SPEC: NORMAL
PATH REPORT.GROSS SPEC: NORMAL
PATH REPORT.MICROSCOPIC SPEC OTHER STN: NORMAL
PATH REPORT.RELEVANT HX SPEC: NORMAL
PHOTO IMAGE: NORMAL

## 2022-05-27 ENCOUNTER — OFFICE VISIT (OUTPATIENT)
Dept: UROLOGY | Facility: CLINIC | Age: 62
End: 2022-05-27
Payer: COMMERCIAL

## 2022-05-27 VITALS — HEART RATE: 83 BPM | DIASTOLIC BLOOD PRESSURE: 82 MMHG | RESPIRATION RATE: 16 BRPM | SYSTOLIC BLOOD PRESSURE: 132 MMHG

## 2022-05-27 DIAGNOSIS — R97.20 ELEVATED PROSTATE SPECIFIC ANTIGEN (PSA): Primary | ICD-10-CM

## 2022-05-27 DIAGNOSIS — N40.1 BENIGN PROSTATIC HYPERPLASIA WITH LOWER URINARY TRACT SYMPTOMS, SYMPTOM DETAILS UNSPECIFIED: ICD-10-CM

## 2022-05-27 PROCEDURE — 99213 OFFICE O/P EST LOW 20 MIN: CPT | Performed by: UROLOGY

## 2022-05-27 NOTE — NURSING NOTE
"Chief Complaint   Patient presents with     Results     Prostate Bx       Initial /82 (BP Location: Right arm, Patient Position: Sitting, Cuff Size: Adult Large)   Pulse 83   Resp 16  Estimated body mass index is 29.01 kg/m  as calculated from the following:    Height as of 11/4/19: 1.981 m (6' 6\").    Weight as of 1/2/20: 113.9 kg (251 lb).  BP completed using cuff size: large  Medications and allergies reviewed.      Mariely VILLAFANA MA    "

## 2022-05-27 NOTE — PROGRESS NOTES
Chief Complaint   Patient presents with     Results     Prostate Bx       James Rashid is a 62 year old male who presents today for follow up of   Chief Complaint   Patient presents with     Results     Prostate Bx    f/u for benign prostatic hyperplasia/elevated psa.  He is s/p prostate biopsy last week.  Prostate size was 150 gm.  He is on flomax and was started on proscar several months ago.  LUTS are better.    Current Outpatient Medications   Medication Sig Dispense Refill     amiodarone (PACERONE/CODARONE) 200 MG tablet Take 1 tablet (200 mg) by mouth daily 90 tablet 1     aspirin (ASA) 325 MG EC tablet Take 325 mg by mouth daily       finasteride (PROSCAR) 5 MG tablet Take 1 tablet (5 mg) by mouth daily 90 tablet 3     levofloxacin (LEVAQUIN) 500 MG tablet Take 1 tablet (500 mg) by mouth daily 3 tablet 0     lisinopril (ZESTRIL) 5 MG tablet Take 1 tablet (5 mg) by mouth daily Needs to follow up MD 10 tablet 0     methocarbamol (ROBAXIN) 500 MG tablet Take 1-2 tablets (500-1,000 mg) by mouth 3 times daily as needed 30 tablet 1     methylPREDNISolone (MEDROL DOSEPAK) 4 MG tablet therapy pack Follow Package Directions 21 tablet 0     tamsulosin (FLOMAX) 0.4 MG capsule Take 1 capsule (0.4 mg) by mouth At Bedtime 90 capsule 3     No Known Allergies   Past Medical History:   Diagnosis Date     Adenomatous polyp 11/2016     Atrial fibrillation (H) 4/12     Epicondylitis, medial humeral 3/24/2014     Hypertension      Sleep apnea      Past Surgical History:   Procedure Laterality Date     COLONOSCOPY  11/2016    Q 5 years, polyps     SURGICAL HISTORY OF -   7/28/15    cardioversion for A Fib     Family History   Problem Relation Age of Onset     Hypertension Father      Asthma Son      Cancer No family hx of      Diabetes No family hx of      Thyroid Disease No family hx of      Cerebrovascular Disease No family hx of      Glaucoma No family hx of      Macular Degeneration No family hx of      Social History      Socioeconomic History     Marital status:      Spouse name: None     Number of children: None     Years of education: None     Highest education level: None   Tobacco Use     Smoking status: Never Smoker     Smokeless tobacco: Never Used   Substance and Sexual Activity     Alcohol use: Yes     Comment: occasional     Drug use: No     Sexual activity: Yes     Partners: Female       REVIEW OF SYSTEMS  =================  C: NEGATIVE for fever, chills, change in weight  I: NEGATIVE for worrisome rashes, moles or lesions  E/M: NEGATIVE for ear, mouth and throat problems  R: NEGATIVE for significant cough or SHORTNESS OF BREATH  CV:  NEGATIVE for chest pain, palpitations or peripheral edema  GI: NEGATIVE for nausea, abdominal pain, heartburn, or change in bowel habits  NEURO: NEGATIVE numbness/weakness  : see HPI  PSYCH: NEGATIVE depression/anxiety  LYmph: no new enlarged lymph nodes  Ortho: no new trauma/movements    Physical Exam:  /82 (BP Location: Right arm, Patient Position: Sitting, Cuff Size: Adult Large)   Pulse 83   Resp 16    Patient is pleasant, in no acute distress, good general condition.  Lung: no evidence of respiratory distress    Abdomen: Soft, nondistended, non tender. No masses. No rebound or guarding.   Exam: normal male  Skin: Warm and dry.  No redness.  Psych: normal mood and affect  Neuro: alert and oriented  Musculaskeletal: moving all extremities    Case Report   Surgical Pathology Report                         Case: [id number]                                   Authorizing Provider:  James Cardozo MD     Collected:           05/20/2022 08:21 AM           Ordering Location:     [facility]   Received:            05/20/2022 08:21 AM                                  [place]                                                                       Pathologist:           Cezar Kumar,                                                                            MD                                                                            Specimens:   A) - Prostate, Left                                                                                  B) - Prostate, Right                                                                       Final Diagnosis   A.  Prostate, left, biopsy-benign  Benign prostate tissue with atrophic change    B.  Prostate, right, biopsy-  Benign prostate tissue   Electronically signed by Cezar Kumar MD on 5/24/2022 at 11:02 AM         Assessment/Plan:   (R97.20) Elevated prostate specific antigen (PSA)  (primary encounter diagnosis)  Comment: path discussed   Plan: no cancer found.           Recheck psa in one year    (N40.1) Benign prostatic hyperplasia with lower urinary tract symptoms, symptom details unspecified  Comment: cont with meds  Plan: recheck in one year.

## 2023-01-24 DIAGNOSIS — N40.1 BENIGN PROSTATIC HYPERPLASIA WITH LOWER URINARY TRACT SYMPTOMS, SYMPTOM DETAILS UNSPECIFIED: ICD-10-CM

## 2023-01-24 RX ORDER — FINASTERIDE 5 MG/1
5 TABLET, FILM COATED ORAL DAILY
Qty: 90 TABLET | Refills: 1 | Status: SHIPPED | OUTPATIENT
Start: 2023-01-24 | End: 2023-09-19

## 2023-01-24 NOTE — TELEPHONE ENCOUNTER
Refilled Finasteride. 90 with 1 refills. Last OV= 05/2022.    Lashawn TOVAR RN Urology 1/24/2023 1:27 PM

## 2023-03-22 DIAGNOSIS — N40.1 BENIGN PROSTATIC HYPERPLASIA WITH LOWER URINARY TRACT SYMPTOMS, SYMPTOM DETAILS UNSPECIFIED: ICD-10-CM

## 2023-03-22 RX ORDER — TAMSULOSIN HYDROCHLORIDE 0.4 MG/1
0.4 CAPSULE ORAL AT BEDTIME
Qty: 90 CAPSULE | Refills: 3 | OUTPATIENT
Start: 2023-03-22

## 2023-03-22 NOTE — TELEPHONE ENCOUNTER
Requested Prescriptions   Pending Prescriptions Disp Refills     tamsulosin (FLOMAX) 0.4 MG capsule 90 capsule 3     Sig: Take 1 capsule (0.4 mg) by mouth At Bedtime       There is no refill protocol information for this order

## 2023-04-01 ENCOUNTER — TRANSFERRED RECORDS (OUTPATIENT)
Dept: MULTI SPECIALTY CLINIC | Facility: CLINIC | Age: 63
End: 2023-04-01

## 2023-04-01 LAB — RETINOPATHY: NORMAL

## 2023-08-05 ENCOUNTER — TRANSFERRED RECORDS (OUTPATIENT)
Dept: MULTI SPECIALTY CLINIC | Facility: CLINIC | Age: 63
End: 2023-08-05

## 2023-08-05 LAB — RETINOPATHY: NORMAL

## 2023-09-19 DIAGNOSIS — N40.1 BENIGN PROSTATIC HYPERPLASIA WITH LOWER URINARY TRACT SYMPTOMS, SYMPTOM DETAILS UNSPECIFIED: ICD-10-CM

## 2023-09-20 RX ORDER — FINASTERIDE 5 MG/1
5 TABLET, FILM COATED ORAL DAILY
Qty: 90 TABLET | Refills: 0 | Status: SHIPPED | OUTPATIENT
Start: 2023-09-20 | End: 2024-01-09

## 2023-09-20 NOTE — TELEPHONE ENCOUNTER
Pending Prescriptions:                       Disp   Refills    finasteride (PROSCAR) 5 MG tablet         90 tab*0            Sig: Take 1 tablet (5 mg) by mouth daily    ONE TIME LENCHO refill. Last OV= 05/2022. Letter sent to patient to schedule an appointment for future fills.    Lashawn TOVAR RN Urology 9/20/2023 12:48 PM

## 2023-12-20 DIAGNOSIS — N40.1 BENIGN PROSTATIC HYPERPLASIA WITH LOWER URINARY TRACT SYMPTOMS, SYMPTOM DETAILS UNSPECIFIED: ICD-10-CM

## 2023-12-20 RX ORDER — FINASTERIDE 5 MG/1
5 TABLET, FILM COATED ORAL DAILY
Qty: 90 TABLET | Refills: 0 | Status: CANCELLED | OUTPATIENT
Start: 2023-12-20

## 2023-12-29 NOTE — TELEPHONE ENCOUNTER
Finasteride  5 mg tabs      Last Written Prescription Date:  09-20-23  Last Fill Quantity: 90 ,   # refills: 0  Last Office Visit: 05-27-22  Future Office visit:     NONE  Routing refill request to provider for review/approval because:  Medication is reported/historical   Pending Prescriptions:                       Disp   Refills    finasteride (PROSCAR) 5 MG tablet         90 tab*0            Sig: Take 1 tablet (5 mg) by mouth daily   April St Usha CMA 12/29/2023 11:56 AM

## 2024-01-03 ENCOUNTER — TELEPHONE (OUTPATIENT)
Dept: UROLOGY | Facility: CLINIC | Age: 64
End: 2024-01-03
Payer: COMMERCIAL

## 2024-01-03 DIAGNOSIS — N40.1 BENIGN PROSTATIC HYPERPLASIA WITH LOWER URINARY TRACT SYMPTOMS, SYMPTOM DETAILS UNSPECIFIED: ICD-10-CM

## 2024-01-03 RX ORDER — FINASTERIDE 5 MG/1
5 TABLET, FILM COATED ORAL DAILY
Qty: 90 TABLET | Refills: 0 | OUTPATIENT
Start: 2024-01-03

## 2024-01-03 NOTE — TELEPHONE ENCOUNTER
Medication Question or Refill        What medication are you calling about (include dose and sig)?: finasteride 5 mg tablet    Preferred Pharmacy:   [facility]/pharmacy #[id number] - [place], [place] - [address]  [address]  [place] [address]  Phone: [phone] Fax: [phone]    [facility] [facility] - [facility] AT Portal to Registered [facility]  [facility]  [place] [address]  Phone: [phone] Fax: [phone]      Controlled Substance Agreement on file:   CSA -- Patient Level:    CSA: None found at the patient level.       Who prescribed the medication?: Dr. Cardozo    Do you need a refill? Yes    When did you use the medication last? About one week    Patient offered an appointment? No    Do you have any questions or concerns?  Yes: Patient needs a refill of finasteride 5mg tablet       Okay to leave a detailed message?: Yes at Cell number on file:    Telephone Information:   Mobile [phone]

## 2024-01-03 NOTE — TELEPHONE ENCOUNTER
Patient refill denied.     Patient needs to be seen in clinic. Elis refill was given in September.     Iqra FUENTES RN, Specialty Clinic 01/03/24 8:33 AM

## 2024-01-04 NOTE — TELEPHONE ENCOUNTER
DENIED refill request for Proscar. Last OV= 05/2022. [facility]    Lashawn TOVAR RN Urology 6/27/2022 1:53 PM

## 2024-01-09 RX ORDER — FINASTERIDE 5 MG/1
5 TABLET, FILM COATED ORAL DAILY
Qty: 30 TABLET | Refills: 0 | Status: SHIPPED | OUTPATIENT
Start: 2024-01-09 | End: 2024-01-25

## 2024-01-09 RX ORDER — TAMSULOSIN HYDROCHLORIDE 0.4 MG/1
0.4 CAPSULE ORAL AT BEDTIME
Qty: 30 CAPSULE | Refills: 0 | Status: SHIPPED | OUTPATIENT
Start: 2024-01-09 | End: 2024-01-25

## 2024-01-09 NOTE — TELEPHONE ENCOUNTER
Writer called and left VM for patient to call back to offer VV on 1.25 ED URO.  Awaiting call back.    Lashawn TOVAR RN Urology 1/9/2024 1:26 PM

## 2024-01-09 NOTE — TELEPHONE ENCOUNTER
Appt made for yearly med refill. 30 day given.  Signed Prescriptions:                        Disp   Refills    finasteride (PROSCAR) 5 MG tablet          30 tab*0        Sig: Take 1 tablet (5 mg) by mouth daily  Authorizing Provider: KRISTINA PEARSON  Ordering User: HELGA WALKER    tamsulosin (FLOMAX) 0.4 MG capsule         30 cap*0        Sig: Take 1 capsule (0.4 mg) by mouth at bedtime  Authorizing Provider: KRISTINA PEARSON  Ordering User: HELGA WALKER    Refused Prescriptions:                       Disp   Refills    finasteride (PROSCAR) 5 MG tablet          90 tab*0        Sig: Take 1 tablet (5 mg) by mouth daily  Refused By: AYESHA ROBERTO  Reason for Refusal: Patient needs appointment  Reason for Refusal Comment: patient overdue for appointment.      Lashawn TOVAR RN   [facility], Urology   1/9/2024 3:22 PM

## 2024-01-09 NOTE — TELEPHONE ENCOUNTER
[facility] Call Center    Phone Message    May a detailed message be left on voicemail: yes     Reason for Call: Medication Refill Request    Has the patient contacted the pharmacy for the refill? Yes   Name of medication being requested: finasteride 5 mg tablet   Provider who prescribed the medication: James Cardozo   Pharmacy: [facility]/PHARMACY #[id number] - [place], [place] - [address],    Date medication is needed: asap    Pt scheduled for 1st available 3/12    Action Taken: Message routed to:  Other: Uro    Travel Screening: Not Applicable

## 2024-01-25 ENCOUNTER — VIRTUAL VISIT (OUTPATIENT)
Dept: UROLOGY | Facility: CLINIC | Age: 64
End: 2024-01-25
Payer: COMMERCIAL

## 2024-01-25 DIAGNOSIS — R97.20 ELEVATED PROSTATE SPECIFIC ANTIGEN (PSA): Primary | ICD-10-CM

## 2024-01-25 DIAGNOSIS — N40.1 BENIGN PROSTATIC HYPERPLASIA WITH LOWER URINARY TRACT SYMPTOMS, SYMPTOM DETAILS UNSPECIFIED: ICD-10-CM

## 2024-01-25 PROCEDURE — 99212 OFFICE O/P EST SF 10 MIN: CPT | Mod: 95 | Performed by: UROLOGY

## 2024-01-25 RX ORDER — FINASTERIDE 5 MG/1
5 TABLET, FILM COATED ORAL DAILY
Qty: 90 TABLET | Refills: 3 | Status: SHIPPED | OUTPATIENT
Start: 2024-01-25 | End: 2024-05-29

## 2024-01-25 RX ORDER — TAMSULOSIN HYDROCHLORIDE 0.4 MG/1
0.4 CAPSULE ORAL AT BEDTIME
Qty: 90 CAPSULE | Refills: 3 | Status: SHIPPED | OUTPATIENT
Start: 2024-01-25

## 2024-01-25 ASSESSMENT — PAIN SCALES - GENERAL: PAINLEVEL: NO PAIN (0)

## 2024-01-25 NOTE — LETTER
1/25/2024       RE: James Rashid  [address]  [place] [address]     Dear Colleague,    Thank you for referring your patient, James Rashid, to the [facility] at [facility]. Please see a copy of my visit note below.    James is a 63 year old who is being evaluated via a billable video visit.      How would you like to obtain your AVS? MyChart  If the video visit is dropped, the invitation should be resent by: Text to cell phone: [phone]  Will anyone else be joining your video visit? No          Assessment & Plan  Problem List Items Addressed This Visit    None  Visit Diagnoses       Elevated prostate specific antigen (PSA)    -  Primary    Relevant Orders    PSA tumor marker    Benign prostatic hyperplasia with lower urinary tract symptoms, symptom details unspecified        Relevant Medications    finasteride (PROSCAR) 5 MG tablet    tamsulosin (FLOMAX) 0.4 MG capsule             Ordering of each unique test  Prescription drug management         No follow-ups on file.      Subjective  James is a 63 year old, presenting for the following health issues:  RECHECK (Medication refills)    HPI     Follow up for benign prostatic hyperplasia/elevated psa.  He has h/o elevated psa s/p biopsy in the past.  Prostate size was 130 gms.  He is on flomax/proscar for LUTS.  He has no urinary complaints.      Review of Systems  Constitutional, HEENT, cardiovascular, pulmonary, gi and gu systems are negative, except as otherwise noted.      Objective   Vitals - Patient Reported  Pain Score: No Pain (0)        Physical Exam   GENERAL: alert and no distress  RESP: No audible wheeze, cough, or visible cyanosis.    NEURO: Cranial nerves grossly intact.  Mentation and speech appropriate for age.  PSYCH: Appropriate affect, tone, and pace of words     Elevated psa:  check psa  BPH; cont with meds.  Patient to come in for bladder scan  for residual urine.      Video-Visit Details    Type of service:  Video Visit     Originating Location (pt. Location): Home    Distant Location (provider location):  On-site  Platform used for Video Visit: Telephone  Signed Electronically by: James Cardozo MD

## 2024-01-25 NOTE — NURSING NOTE
Please send pt Doximity link when ready for visit to pt mobile [phone] .  Pt is prepared/ready for Dox link. Pt unsuccessful at joining [place] video    Is the patient currently in the state of [place]? YES    Visit mode:VIDEO    If the visit is dropped, the patient can be reconnected by: VIDEO VISIT: Text to cell phone:   Telephone Information:   Mobile [phone]       Will anyone else be joining the visit? NO  (If patient encounters technical issues they should call [phone])    How would you like to obtain your AVS? MyChart    Are changes needed to the allergy or medication list? No    Reason for visit: RECHECK (Medication refills)    Ca VICK

## 2024-01-26 ENCOUNTER — ALLIED HEALTH/NURSE VISIT (OUTPATIENT)
Dept: UROLOGY | Facility: CLINIC | Age: 64
End: 2024-01-26
Payer: COMMERCIAL

## 2024-01-26 DIAGNOSIS — N40.1 BENIGN PROSTATIC HYPERPLASIA WITH LOWER URINARY TRACT SYMPTOMS, SYMPTOM DETAILS UNSPECIFIED: Primary | ICD-10-CM

## 2024-01-26 PROCEDURE — 51798 US URINE CAPACITY MEASURE: CPT

## 2024-05-29 ENCOUNTER — ANCILLARY PROCEDURE (OUTPATIENT)
Dept: ULTRASOUND IMAGING | Facility: CLINIC | Age: 64
End: 2024-05-29
Attending: FAMILY MEDICINE
Payer: COMMERCIAL

## 2024-05-29 ENCOUNTER — OFFICE VISIT (OUTPATIENT)
Dept: FAMILY MEDICINE | Facility: CLINIC | Age: 64
End: 2024-05-29
Payer: COMMERCIAL

## 2024-05-29 VITALS
OXYGEN SATURATION: 97 % | DIASTOLIC BLOOD PRESSURE: 84 MMHG | HEART RATE: 81 BPM | WEIGHT: 250 LBS | SYSTOLIC BLOOD PRESSURE: 157 MMHG | RESPIRATION RATE: 16 BRPM | HEIGHT: 78 IN | BODY MASS INDEX: 28.93 KG/M2 | TEMPERATURE: 99 F

## 2024-05-29 DIAGNOSIS — Z12.11 SCREEN FOR COLON CANCER: ICD-10-CM

## 2024-05-29 DIAGNOSIS — N50.811 RIGHT TESTICULAR PAIN: ICD-10-CM

## 2024-05-29 DIAGNOSIS — I48.0 PAF (PAROXYSMAL ATRIAL FIBRILLATION) (H): ICD-10-CM

## 2024-05-29 DIAGNOSIS — N45.1 EPIDIDYMITIS: Primary | ICD-10-CM

## 2024-05-29 DIAGNOSIS — I10 HYPERTENSION GOAL BP (BLOOD PRESSURE) < 140/90: ICD-10-CM

## 2024-05-29 DIAGNOSIS — I77.810 ASCENDING AORTA DILATATION (H): ICD-10-CM

## 2024-05-29 DIAGNOSIS — I42.8 NON-ISCHEMIC CARDIOMYOPATHY (H): ICD-10-CM

## 2024-05-29 DIAGNOSIS — G47.33 OBSTRUCTIVE SLEEP APNEA SYNDROME: ICD-10-CM

## 2024-05-29 LAB
ALBUMIN UR-MCNC: NEGATIVE MG/DL
APPEARANCE UR: CLEAR
BILIRUB UR QL STRIP: NEGATIVE
COLOR UR AUTO: YELLOW
GLUCOSE UR STRIP-MCNC: NEGATIVE MG/DL
HGB UR QL STRIP: NEGATIVE
KETONES UR STRIP-MCNC: NEGATIVE MG/DL
LEUKOCYTE ESTERASE UR QL STRIP: NEGATIVE
NITRATE UR QL: NEGATIVE
PH UR STRIP: 6.5 [PH] (ref 5–7)
SP GR UR STRIP: 1.01 (ref 1–1.03)
UROBILINOGEN UR STRIP-ACNC: 0.2 E.U./DL

## 2024-05-29 PROCEDURE — 99204 OFFICE O/P NEW MOD 45 MIN: CPT | Performed by: FAMILY MEDICINE

## 2024-05-29 PROCEDURE — 81003 URINALYSIS AUTO W/O SCOPE: CPT | Performed by: FAMILY MEDICINE

## 2024-05-29 PROCEDURE — G2211 COMPLEX E/M VISIT ADD ON: HCPCS | Performed by: FAMILY MEDICINE

## 2024-05-29 PROCEDURE — 76870 US EXAM SCROTUM: CPT | Mod: TC | Performed by: RADIOLOGY

## 2024-05-29 PROCEDURE — 93976 VASCULAR STUDY: CPT | Mod: TC | Performed by: RADIOLOGY

## 2024-05-29 RX ORDER — SULFAMETHOXAZOLE/TRIMETHOPRIM 800-160 MG
1 TABLET ORAL 2 TIMES DAILY
Qty: 42 TABLET | Refills: 0 | Status: SHIPPED | OUTPATIENT
Start: 2024-05-29 | End: 2024-08-02

## 2024-05-29 RX ORDER — LISINOPRIL 10 MG/1
1 TABLET ORAL
COMMUNITY
Start: 2024-03-21

## 2024-05-29 NOTE — LETTER
May 29, 2024    James Rashid  [address]  [place] [address]          Dear ,    We are writing to inform you of your test results.  Urine is normal       Resulted Orders   UA Macroscopic with reflex to Microscopic and Culture - Lab Collect   Result Value Ref Range    Color Urine Yellow Colorless, Straw, Light Yellow, Yellow    Appearance Urine Clear Clear    Glucose Urine Negative Negative mg/dL    Bilirubin Urine Negative Negative    Ketones Urine Negative Negative mg/dL    Specific Gravity Urine 1.015 1.003 - 1.035    Blood Urine Negative Negative    pH Urine 6.5 5.0 - 7.0    Protein Albumin Urine Negative Negative mg/dL    Urobilinogen Urine 0.2 0.2, 1.0 E.U./dL    Nitrite Urine Negative Negative    Leukocyte Esterase Urine Negative Negative    Narrative    Microscopic not indicated       If you have any questions or concerns, please call the clinic at the number listed above.       Sincerely,      Kathie Galo MD

## 2024-05-29 NOTE — PATIENT INSTRUCTIONS
For scheduling at [facility] ([facility] [place]), call [phone] or [phone]     For scheduling in the North ([facility], [facility]) call  [phone] or  [phone]        For scheduling in the South ([facility], [facility]) call [phone]  or   [phone]

## 2024-05-29 NOTE — PROGRESS NOTES
Assessment & Plan     Right testicular pain  Advised   Follow up if not better  - US Testicular & Scrotum w Doppler Ltd; Future  - UA Macroscopic with reflex to Microscopic and Culture - Lab Collect    PAF (paroxysmal atrial Fib  He is off anticoagulants  [id number] score 1  In SR   Ascending aorta dilatation (H24)  Repeat Imaging next year    Non-ischemic cardiomyopathy (H)  Normal EF    Hypertension goal BP (blood pressure) < 140/90  Has been stable     Obstructive sleep apnea syndrome  Does not use Cpap since he lost wt    Screen for colon cancer  Advised   - Colonoscopy Screening  Referral; Future          Amado Ramirez is a 64 year old, presenting for the following health issues:  Groin Pain        5/29/2024    10:22 AM   Additional Questions   Roomed by [facility]     Via the Health Maintenance questionnaire, the patient has reported the following services have been completed -Eye Exam: target 2023-04-01, this information has been sent to the abstraction team.  History of Present Illness       Reason for visit:  Pain in groin  Symptom onset:  1-2 weeks ago    He eats 0-1 servings of fruits and vegetables daily.He consumes 1 sweetened beverage(s) daily.He exercises with enough effort to increase his heart rate 9 or less minutes per day.  He exercises with enough effort to increase his heart rate 3 or less days per week.   He is taking medications regularly.   1. Atrial fibrillation  -initially identified ~2007 in the setting of cardiomyopathy  -recurrent AFib in Mar 2012 >> amiodarone started  -amiodarone DCd in Apr 2014  -recurrent AFib in Apr 2017 >> amiodarone resumed  -amiodarone DCd in Feb 2021  -recurrent AFib in Oct 2021  -s/p PVI with cryo in Nov 2021 >> transiently on sotalol  -recurrent AFib with cardioversion in ED in Apr 2024  -[id number] score of 1 (HTN)  2. Nonischemic cardiomyopathy  -EF 30% per echo at initial diagnosis of AFib in 2007 >> no obstructive CAD per angiogram  -EF  "55-60% per echo in Jul 2012 >> 70% in Feb 2021  3. Dilated ascending aorta  -aortic root 4.6 cm, ascending aorta 4.4 cm per CTA in Feb 2023  4. Hypertension  5. Obstructive sleep apnea  -patient discontinued CPAP after 30lb weight loss       Pt has had soreness on the Right side  No trauma  Feels Like he has been Kicked down in tseticles  Has discomfort  No urinary symptoms  No discharge  Has been Golfing  He sees Cardiology for atrial fib and cardiomyopathy-had Recent Cardioversion in ER  He is doing well  Last echo with Goo EF            Review of Systems  Rest of the ROS is Negative except see above and Problem list [stable]        Objective    BP (!) 157/84 (BP Location: Left arm, Patient Position: Sitting, Cuff Size: Adult Regular)   Pulse 81   Temp 99  F (37.2  C) (Temporal)   Resp 16   Ht 1.981 m (6' 6\")   Wt 113.4 kg (250 lb)   SpO2 97%   BMI 28.89 kg/m    Body mass index is 28.89 kg/m .  Physical Exam   GENERAL: alert and no distress  RESP: lungs clear to auscultation - no rales, rhonchi or wheezes  CV: regular rate and rhythm, normal S1 S2, no S3 or S4, no murmur, click or rub, no peripheral edema  ABDOMEN: soft, nontender, no hepatosplenomegaly, no masses and bowel sounds normal   (male): testicles normal without atrophy or masses, no hernias, penis normal without urethral discharge, and no tenderness   No hernia  MS: no gross musculoskeletal defects noted, no edema    Results for orders placed or performed in visit on 05/29/24   UA Macroscopic with reflex to Microscopic and Culture - Lab Collect     Status: Normal    Specimen: Urine, NOS   Result Value Ref Range    Color Urine Yellow Colorless, Straw, Light Yellow, Yellow    Appearance Urine Clear Clear    Glucose Urine Negative Negative mg/dL    Bilirubin Urine Negative Negative    Ketones Urine Negative Negative mg/dL    Specific Gravity Urine 1.015 1.003 - 1.035    Blood Urine Negative Negative    pH Urine 6.5 5.0 - 7.0    Protein Albumin " Urine Negative Negative mg/dL    Urobilinogen Urine 0.2 0.2, 1.0 E.U./dL    Nitrite Urine Negative Negative    Leukocyte Esterase Urine Negative Negative    Narrative    Microscopic not indicated           Signed Electronically by: Kathie Galo MD

## 2024-05-30 ENCOUNTER — TELEPHONE (OUTPATIENT)
Dept: FAMILY MEDICINE | Facility: CLINIC | Age: 64
End: 2024-05-30
Payer: COMMERCIAL

## 2024-05-30 NOTE — TELEPHONE ENCOUNTER
Left message on patient's VM requesting a return call to [facility] [phone]        Sarah Kelly RN  [facility] Felipa Galo MD  5/30/2024 11:23 AM CDT       Please call Ultrasound shows >? Epididymitis-inflammation of epididymis  I am sending antibiotics to your pharmacy  Follow up 3 weeks if not better with Your Urologist

## 2024-05-31 NOTE — TELEPHONE ENCOUNTER
Patient called back and was notified of provider's message as written.  Patient verbalized understanding.    Sarah Kelly RN  [facility]

## 2024-08-01 DIAGNOSIS — N45.1 EPIDIDYMITIS: ICD-10-CM

## 2024-08-02 RX ORDER — SULFAMETHOXAZOLE/TRIMETHOPRIM 800-160 MG
1 TABLET ORAL 2 TIMES DAILY
Qty: 42 TABLET | Refills: 0 | Status: SHIPPED | OUTPATIENT
Start: 2024-08-02 | End: 2024-08-23

## 2024-11-06 ENCOUNTER — TELEPHONE (OUTPATIENT)
Dept: FAMILY MEDICINE | Facility: CLINIC | Age: 64
End: 2024-11-06
Payer: COMMERCIAL

## 2024-11-06 DIAGNOSIS — N45.1 EPIDIDYMITIS: ICD-10-CM

## 2024-11-07 RX ORDER — SULFAMETHOXAZOLE AND TRIMETHOPRIM 800; 160 MG/1; MG/1
1 TABLET ORAL 2 TIMES DAILY
Qty: 42 TABLET | Refills: 0 | OUTPATIENT
Start: 2024-11-07 | End: 2024-11-28

## 2024-11-07 NOTE — TELEPHONE ENCOUNTER
Called patient.  He clarified that the refill request for abx was a mistake.   He was just trying to refill tamsulosin at the pharmacy. Pharmacy should have refill remaining for that.   Please disregard    Sarah Kelly RN  [facility]

## 2025-02-10 ENCOUNTER — TELEPHONE (OUTPATIENT)
Dept: UROLOGY | Facility: CLINIC | Age: 65
End: 2025-02-10
Payer: COMMERCIAL

## 2025-02-10 DIAGNOSIS — N40.1 BENIGN PROSTATIC HYPERPLASIA WITH LOWER URINARY TRACT SYMPTOMS, SYMPTOM DETAILS UNSPECIFIED: ICD-10-CM

## 2025-02-10 RX ORDER — TAMSULOSIN HYDROCHLORIDE 0.4 MG/1
0.4 CAPSULE ORAL AT BEDTIME
Qty: 90 CAPSULE | Refills: 0 | Status: SHIPPED | OUTPATIENT
Start: 2025-02-10

## 2025-02-10 NOTE — TELEPHONE ENCOUNTER
[facility] Call Center    Phone Message    May a detailed message be left on voicemail: yes     Reason for Call: Medication Refill Request    Has the patient contacted the pharmacy for the refill? Yes   Name of medication being requested: tamsulosin (FLOMAX) 0.4 MG capsule   Provider who prescribed the medication: Juliane  Pharmacy:   [id number] - [place] - [address] [facility]     Date medication is needed: Patient ran out of this med yesterday and [place] is going [place] early Wednesday morning. Please rush to refill or call pt if there is a problem. Thank you!      Action Taken: Message routed to:  [facility] ([facility]): Felipa uro    Travel Screening: Not Applicable     Date of Service:

## 2025-02-10 NOTE — TELEPHONE ENCOUNTER
Elis fill can be granted. Called pt and let him know he is due for an appt for any more fills via     CAMILLE Tam RN 2/10/2025 11:43 AM

## 2025-05-15 ENCOUNTER — OFFICE VISIT (OUTPATIENT)
Dept: FAMILY MEDICINE | Facility: CLINIC | Age: 65
End: 2025-05-15
Payer: COMMERCIAL

## 2025-05-15 VITALS
RESPIRATION RATE: 18 BRPM | TEMPERATURE: 97.4 F | HEIGHT: 78 IN | BODY MASS INDEX: 29.65 KG/M2 | SYSTOLIC BLOOD PRESSURE: 118 MMHG | HEART RATE: 62 BPM | OXYGEN SATURATION: 96 % | WEIGHT: 256.25 LBS | DIASTOLIC BLOOD PRESSURE: 72 MMHG

## 2025-05-15 DIAGNOSIS — N39.0 URINARY TRACT INFECTION WITHOUT HEMATURIA, SITE UNSPECIFIED: ICD-10-CM

## 2025-05-15 DIAGNOSIS — N40.1 BENIGN PROSTATIC HYPERPLASIA WITH LOWER URINARY TRACT SYMPTOMS, SYMPTOM DETAILS UNSPECIFIED: ICD-10-CM

## 2025-05-15 DIAGNOSIS — R30.0 DYSURIA: Primary | ICD-10-CM

## 2025-05-15 LAB
ALBUMIN UR-MCNC: 30 MG/DL
APPEARANCE UR: ABNORMAL
BACTERIA #/AREA URNS HPF: ABNORMAL /HPF
BILIRUB UR QL STRIP: NEGATIVE
COLOR UR AUTO: YELLOW
GLUCOSE UR STRIP-MCNC: NEGATIVE MG/DL
HGB UR QL STRIP: ABNORMAL
KETONES UR STRIP-MCNC: NEGATIVE MG/DL
LEUKOCYTE ESTERASE UR QL STRIP: ABNORMAL
NITRATE UR QL: NEGATIVE
PH UR STRIP: 6.5 [PH] (ref 5–7)
RBC #/AREA URNS AUTO: ABNORMAL /HPF
SP GR UR STRIP: 1.02 (ref 1–1.03)
SQUAMOUS #/AREA URNS AUTO: ABNORMAL /LPF
UROBILINOGEN UR STRIP-ACNC: 0.2 E.U./DL
WBC #/AREA URNS AUTO: ABNORMAL /HPF
WBC CLUMPS #/AREA URNS HPF: PRESENT /HPF

## 2025-05-15 RX ORDER — SULFAMETHOXAZOLE AND TRIMETHOPRIM 800; 160 MG/1; MG/1
1 TABLET ORAL 2 TIMES DAILY
Qty: 14 TABLET | Refills: 0 | Status: SHIPPED | OUTPATIENT
Start: 2025-05-15 | End: 2025-05-22

## 2025-05-15 ASSESSMENT — PAIN SCALES - GENERAL: PAINLEVEL_OUTOF10: NO PAIN (0)

## 2025-05-15 NOTE — PROGRESS NOTES
"  Assessment & Plan     (R30.0) Dysuria  (primary encounter diagnosis)  Comment: ua shows likely uti   Plan: UA with Microscopic reflex to Culture - Clinic         Collect, UA Microscopic with Reflex to Culture,        Urine Culture             (N39.0) Urinary tract infection without hematuria, site unspecified  Comment: will treat.  Culture pending   Plan: sulfamethoxazole-trimethoprim (BACTRIM DS)         800-160 MG tablet             (N40.1) Benign prostatic hyperplasia with lower urinary tract symptoms, symptom details unspecified  Comment: patient on meds for this   Plan: continue           BMI  Estimated body mass index is 29.61 kg/m  as calculated from the following:    Height as of this encounter: 1.981 m (6' 6\").    Weight as of this encounter: 116.2 kg (256 lb 4 oz).             Amado Ramirez is a 65 year old, presenting for the following health issues:  Urinary Pain        5/15/2025    10:43 AM   Additional Questions   Roomed by Mally Benavidez     Via the [employer] Maintenance questionnaire, the patient has reported the following services have been completed -Eye Exam: target 2023-08-05, this information has been sent to the abstraction team.  History of Present Illness       Reason for visit:  Uti  Symptom onset:  1-2 weeks ago  Symptom intensity:  Moderate  Symptom progression:  Staying the same  Had these symptoms before:  No  What makes it worse:  No  What makes it better:  Cranberry pills   He is taking medications regularly.         Urgency    Poor stream; this is new    Burning on urination    No fever/ chills    No nausea/ vomiting    Urine looks normal     Drinking fluids okay    No history of uti    No diarrhea           Objective    /72 (BP Location: Left arm, Patient Position: Chair, Cuff Size: Adult Regular)   Pulse 62   Temp 97.4  F (36.3  C) (Temporal)   Resp 18   Ht 1.981 m (6' 6\")   Wt 116.2 kg (256 lb 4 oz)   SpO2 96%   BMI 29.61 kg/m    Body mass index is 29.61 " kg/m .  Physical Exam    Full physical not done     Mentation and affect are fine    No tremor of speech or extremity    No costovertebral angle tenderness    Abd soft nontender    No suprapubic discomfort on palpation    Ua done, lots of wbc and bacteria                   Signed Electronically by: Fam Gonzalez MD

## 2025-05-17 ENCOUNTER — TELEPHONE (OUTPATIENT)
Dept: FAMILY MEDICINE | Facility: CLINIC | Age: 65
End: 2025-05-17
Payer: COMMERCIAL

## 2025-05-17 ENCOUNTER — RESULTS FOLLOW-UP (OUTPATIENT)
Dept: URGENT CARE | Facility: URGENT CARE | Age: 65
End: 2025-05-17

## 2025-05-17 DIAGNOSIS — N39.0 URINARY TRACT INFECTION WITHOUT HEMATURIA, SITE UNSPECIFIED: Primary | ICD-10-CM

## 2025-05-17 LAB — BACTERIA UR CULT: ABNORMAL

## 2025-05-17 RX ORDER — AMOXICILLIN 500 MG/1
500 CAPSULE ORAL 3 TIMES DAILY
Qty: 15 CAPSULE | Refills: 0 | Status: SHIPPED | OUTPATIENT
Start: 2025-05-17 | End: 2025-05-22

## 2025-05-17 NOTE — TELEPHONE ENCOUNTER
I called patient with urine culture results  Change antibiotic  Follow up prn symptoms  Fam Gonzalez MD